# Patient Record
Sex: MALE | Race: WHITE | NOT HISPANIC OR LATINO | Employment: OTHER | URBAN - METROPOLITAN AREA
[De-identification: names, ages, dates, MRNs, and addresses within clinical notes are randomized per-mention and may not be internally consistent; named-entity substitution may affect disease eponyms.]

---

## 2024-09-20 ENCOUNTER — OFFICE VISIT (OUTPATIENT)
Dept: MEDICAL GROUP | Age: 69
End: 2024-09-20
Payer: MEDICARE

## 2024-09-20 VITALS
OXYGEN SATURATION: 95 % | HEART RATE: 63 BPM | WEIGHT: 245 LBS | SYSTOLIC BLOOD PRESSURE: 150 MMHG | HEIGHT: 70 IN | TEMPERATURE: 99.7 F | DIASTOLIC BLOOD PRESSURE: 90 MMHG | BODY MASS INDEX: 35.07 KG/M2

## 2024-09-20 DIAGNOSIS — I10 ESSENTIAL HYPERTENSION, BENIGN: ICD-10-CM

## 2024-09-20 DIAGNOSIS — Z12.5 SCREENING FOR PROSTATE CANCER: ICD-10-CM

## 2024-09-20 DIAGNOSIS — N40.1 BENIGN PROSTATIC HYPERPLASIA WITH URINARY HESITANCY: ICD-10-CM

## 2024-09-20 DIAGNOSIS — Z23 NEED FOR VACCINATION: ICD-10-CM

## 2024-09-20 DIAGNOSIS — Z76.89 ESTABLISHING CARE WITH NEW DOCTOR, ENCOUNTER FOR: ICD-10-CM

## 2024-09-20 DIAGNOSIS — Z12.11 SCREENING FOR COLORECTAL CANCER: ICD-10-CM

## 2024-09-20 DIAGNOSIS — R39.11 BENIGN PROSTATIC HYPERPLASIA WITH URINARY HESITANCY: ICD-10-CM

## 2024-09-20 DIAGNOSIS — K59.1 FUNCTIONAL DIARRHEA: ICD-10-CM

## 2024-09-20 DIAGNOSIS — Z00.00 BLOOD TESTS FOR ROUTINE GENERAL PHYSICAL EXAMINATION: ICD-10-CM

## 2024-09-20 DIAGNOSIS — E55.9 VITAMIN D DEFICIENCY: ICD-10-CM

## 2024-09-20 DIAGNOSIS — E03.9 ACQUIRED HYPOTHYROIDISM: ICD-10-CM

## 2024-09-20 DIAGNOSIS — E66.9 OBESITY (BMI 30-39.9): ICD-10-CM

## 2024-09-20 DIAGNOSIS — Z85.46 HISTORY OF PROSTATE CANCER: ICD-10-CM

## 2024-09-20 DIAGNOSIS — Z12.12 SCREENING FOR COLORECTAL CANCER: ICD-10-CM

## 2024-09-20 DIAGNOSIS — M17.2 POST-TRAUMATIC OSTEOARTHRITIS OF BOTH KNEES: ICD-10-CM

## 2024-09-20 PROBLEM — K52.9 COLITIS: Status: ACTIVE | Noted: 2020-05-01

## 2024-09-20 PROBLEM — Z87.442 HISTORY OF RENAL STONE: Status: ACTIVE | Noted: 2024-09-20

## 2024-09-20 PROBLEM — C61 PROSTATE CANCER (HCC): Chronic | Status: ACTIVE | Noted: 2017-09-18

## 2024-09-20 PROBLEM — Z87.442 HISTORY OF KIDNEY STONES: Status: RESOLVED | Noted: 2024-09-20 | Resolved: 2024-09-20

## 2024-09-20 PROCEDURE — 90677 PCV20 VACCINE IM: CPT | Performed by: PHYSICIAN ASSISTANT

## 2024-09-20 PROCEDURE — 3077F SYST BP >= 140 MM HG: CPT | Performed by: PHYSICIAN ASSISTANT

## 2024-09-20 PROCEDURE — 3080F DIAST BP >= 90 MM HG: CPT | Performed by: PHYSICIAN ASSISTANT

## 2024-09-20 PROCEDURE — G0009 ADMIN PNEUMOCOCCAL VACCINE: HCPCS | Performed by: PHYSICIAN ASSISTANT

## 2024-09-20 PROCEDURE — 99204 OFFICE O/P NEW MOD 45 MIN: CPT | Mod: 25 | Performed by: PHYSICIAN ASSISTANT

## 2024-09-20 RX ORDER — TAMSULOSIN HYDROCHLORIDE 0.4 MG/1
0.4 CAPSULE ORAL
Qty: 30 CAPSULE | Refills: 1 | Status: SHIPPED | OUTPATIENT
Start: 2024-09-20

## 2024-09-20 RX ORDER — LIOTHYRONINE SODIUM 5 UG/1
5 TABLET ORAL DAILY
COMMUNITY
Start: 2024-09-16

## 2024-09-20 RX ORDER — LISINOPRIL 40 MG/1
1 TABLET ORAL
COMMUNITY
Start: 2024-09-16

## 2024-09-20 RX ORDER — CYANOCOBALAMIN (VITAMIN B-12) 1000 MCG
1 TABLET ORAL DAILY
COMMUNITY

## 2024-09-20 RX ORDER — METOPROLOL SUCCINATE 100 MG/1
100 TABLET, EXTENDED RELEASE ORAL DAILY
COMMUNITY
Start: 2024-06-30

## 2024-09-20 RX ORDER — ALLOPURINOL 100 MG/1
200 TABLET ORAL DAILY
COMMUNITY

## 2024-09-20 RX ORDER — LEVOTHYROXINE SODIUM 200 UG/1
200 TABLET ORAL
COMMUNITY
Start: 2024-07-24

## 2024-09-20 RX ORDER — CHLORAL HYDRATE 500 MG
1000 CAPSULE ORAL DAILY
COMMUNITY

## 2024-09-20 RX ORDER — TAMSULOSIN HYDROCHLORIDE 0.4 MG/1
0.4 CAPSULE ORAL
COMMUNITY
End: 2024-09-20

## 2024-09-20 RX ORDER — LANOLIN ALCOHOL/MO/W.PET/CERES
1200 CREAM (GRAM) TOPICAL DAILY
COMMUNITY

## 2024-09-20 ASSESSMENT — PATIENT HEALTH QUESTIONNAIRE - PHQ9: CLINICAL INTERPRETATION OF PHQ2 SCORE: 0
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
## 2024-09-26 RX ORDER — CHOLESTYRAMINE 4 G/9G
1 POWDER, FOR SUSPENSION ORAL 2 TIMES DAILY
Qty: 1 EACH | Refills: 1
Start: 2024-09-26

## 2024-09-27 ENCOUNTER — OFFICE VISIT (OUTPATIENT)
Dept: UROLOGY | Facility: MEDICAL CENTER | Age: 69
End: 2024-09-27
Payer: MEDICARE

## 2024-09-27 VITALS — WEIGHT: 247.5 LBS | HEIGHT: 70 IN | BODY MASS INDEX: 35.43 KG/M2

## 2024-09-27 DIAGNOSIS — N99.112 POSTPROCEDURAL MEMBRANOUS URETHRAL STRICTURE: ICD-10-CM

## 2024-09-27 DIAGNOSIS — N13.5 URETERAL STRICTURE: ICD-10-CM

## 2024-09-27 DIAGNOSIS — Z85.46 HISTORY OF PROSTATE CANCER: ICD-10-CM

## 2024-09-27 RX ORDER — FUROSEMIDE 10 MG/ML
0.5 INJECTION INTRAMUSCULAR; INTRAVENOUS ONCE
Status: DISCONTINUED | OUTPATIENT
Start: 2024-09-27 | End: 2024-09-28

## 2024-09-27 NOTE — PROGRESS NOTES
Chief Complaint: prostate cancer, urinary symptoms    The patient was referred by Debbie Mays P.A.-C. for evaluation of the above chief complaint    History of Present Illness:    Moise Cagle is a 68 y.o. male who presents today for prostate cancer and urinary symptoms  - Diagnosed with high risk prostate cancer in 2017  - Received XRT + ADT + alexis/pred around that time  - Reports ADT + alexis/pred x 2 years  - Since that time PSA has remained undetectable  - No recent T testing  - Also with urinary symptoms, ?urethral stricture prior to radiation  - Has required multiple dilations in the past, documentation from  and   - Reports 2 pads / day incontinence  - Also with weak stream and incomplete emptying  - Also with ?L Hydronephrosis per US  - History of L stent in  with [facility] urology    Subjective    Family History   Problem Relation Age of Onset    Colon Cancer Mother     Glaucoma Father     No Known Problems Sister     No Known Problems Sister     No Known Problems Brother     No Known Problems Daughter     No Known Problems Son     No Known Problems Son      Social History     Socioeconomic History    Marital status:      Spouse name: Not on file    Number of children: Not on file    Years of education: Not on file    Highest education level: Not on file   Occupational History    Not on file   Tobacco Use    Smoking status: Former     Current packs/day: 0.00     Types: Cigarettes     Quit date:      Years since quittin.7     Passive exposure: Past    Smokeless tobacco: Former   Vaping Use    Vaping status: Never Used   Substance and Sexual Activity    Alcohol use: Not Currently     Alcohol/week: 0.6 oz     Types: 1 Cans of beer per week     Comment: socially    Drug use: Yes     Types: Marijuana     Comment: socially    Sexual activity: Not Currently     Partners: Female   Other Topics Concern    Not on file   Social History Narrative    Not on file     Social Determinants of  Health     Financial Resource Strain: Not on file   Food Insecurity: Not on file   Transportation Needs: Not on file   Physical Activity: Not on file   Stress: Not on file   Social Connections: Not on file   Intimate Partner Violence: Not on file   Housing Stability: Not on file     Past Surgical History:   Procedure Laterality Date    RESECTION, PARTIAL SMALL BOWEL  01/2020    OTHER      patella resection    TURP-VAPOR      UMBILICAL HERNIA REPAIR       Past Medical History:   Diagnosis Date    History of kidney stones 09/20/2024    Passed another 3-, 6-       Current Outpatient Medications   Medication Sig Dispense Refill    cholestyramine (QUESTRAN) 4 g packet Take 4 g by mouth 2 times a day. 1 Each 1    ASCORBIC ACID PO Take  by mouth every day.      calcium citrate 315 mg-vitamin D 5 mcg 315-5 MG-MCG per tablet Take 1,200 mg by mouth every day.      MULTIPLE VITAMIN PO Take 1 Tablet by mouth every day.      allopurinol (ZYLOPRIM) 100 MG Tab Take 200 mg by mouth every day.      Cyanocobalamin (B-12) 1000 MCG Tab Take 1 Tablet by mouth every day.      levothyroxine (SYNTHROID) 200 MCG Tab Take 200 mcg by mouth every morning on an empty stomach.      liothyronine (CYTOMEL) 5 MCG Tab Take 5 mcg by mouth every day.      lisinopril (PRINIVIL) 40 MG tablet Take 1 Tablet by mouth every day.      metoprolol SR (TOPROL XL) 100 MG TABLET SR 24 HR Take 100 mg by mouth every day.      Omega-3 Fatty Acids (FISH OIL) 1000 MG Cap capsule Take 1,000 mg by mouth every day.      Ferrous Sulfate (IRON PO) Take  by mouth.      Nutritional Supplements (NUTRITIONAL SUPPLEMENT PO) Take  by mouth. Heal n Soothe      tamsulosin (FLOMAX) 0.4 MG capsule Take 1 Capsule by mouth 1/2 hour after breakfast. 30 Capsule 1     No current facility-administered medications for this visit.     Allergies   Allergen Reactions    Hydrocodone-Acetaminophen Unspecified     Teeth grinding anxious (vicodin)    Teeth grinding anxious  (vicodin)   Anxiety    Morphine Unspecified     Hallucination     Hallucination    halucinates    Amlodipine Unspecified     Swelling of feet and ankles    Other Drug      Hay fever reaction Runny nose, cough       Review of systems was conducted and was negative except for as explicitly listed in the History of Present Illness.       Objective   There were no vitals taken for this visit.  Physical Exam    Lab/Radiology/Diagnostic Review:  CBC   Lab Results   Component Value Date/Time    WBC 4.9 01/22/2020 0510    HEMOGLOBIN 8.7 (L) 01/22/2020 0510    HEMATOCRIT 27.6 (L) 01/22/2020 0510    MCV 94 01/22/2020 0510    MCH 29.5 01/22/2020 0510    MCHC 31.5 01/22/2020 0510    RDW 14.8 01/22/2020 0510    MONOS 0.5 01/22/2020 0510    EOS 0.2 01/22/2020 0510    BASO 0.0 01/22/2020 0510       BMP   Lab Results   Component Value Date/Time    SODIUM 142 01/22/2020 0510    POTASSIUM 4.2 01/22/2020 0510    CHLORIDE 105 01/22/2020 0510    CO2 32 01/22/2020 0510    GLUCOSE 109 (H) 01/22/2020 0510    BUN 10 01/22/2020 0510    CREATININE 1.42 (H) 01/22/2020 0510    CALCIUM 8.9 01/22/2020 0510     Most recent PSA < 0.2 ng/mL    US RENAL 9/2024:    CONCLUSION: Stable severe left hydronephrosis with cortical thinning.   Stable right renal cyst and nonobstructing stone.     I have reviewed and independently examined the lab and imaging results.  My findings are given in the HPI or above with the associated tests.        Assessment & Plan    It was a pleasure speaking with Moise IVAN Cagle today.    Moise LOVE Gurjit is a 68 y.o. male w/ prostate cancer and urinary symptoms  1) prostate cancer  - Discussed AUA and NCCN guidelines  - Will continue with PSA monitoring  - Recommend T monitoring given prior history of ADT  2) urinary incontinence  - Recommend discontinuation of tamsulosin as this may worsen his incontinence  - Recommend RUG with Dr. Burleson for further evaluation  - Dr. Burleson was able to step into the office today to discuss  with the patient  3) left hydronephrosis  - Discussed options may include stent or reimplant  - Discussed if the kidney is non-functional and he is asymptomatic, we do not need to pursue treatment  - Recommend lasix renal scan to further evaluate renal function   - If <10% differential function, unlikely that reimplant will be helpful  - If >10% differential function, may consider stent vs. Reimplant if asymptomatic  - If symptomatic, will consider reimplant vs. Nephrectomy depending on function

## 2024-09-30 DIAGNOSIS — N99.112 POSTPROCEDURAL MEMBRANOUS URETHRAL STRICTURE: ICD-10-CM

## 2024-10-01 ENCOUNTER — TELEPHONE (OUTPATIENT)
Dept: UROLOGY | Facility: MEDICAL CENTER | Age: 69
End: 2024-10-01
Payer: MEDICARE

## 2024-10-09 ENCOUNTER — HOSPITAL ENCOUNTER (OUTPATIENT)
Dept: LAB | Facility: MEDICAL CENTER | Age: 69
End: 2024-10-09
Attending: PHYSICIAN ASSISTANT
Payer: MEDICARE

## 2024-10-09 DIAGNOSIS — Z12.5 SCREENING FOR PROSTATE CANCER: ICD-10-CM

## 2024-10-09 DIAGNOSIS — E55.9 VITAMIN D DEFICIENCY: ICD-10-CM

## 2024-10-09 DIAGNOSIS — Z00.00 BLOOD TESTS FOR ROUTINE GENERAL PHYSICAL EXAMINATION: ICD-10-CM

## 2024-10-09 DIAGNOSIS — E03.9 ACQUIRED HYPOTHYROIDISM: ICD-10-CM

## 2024-10-09 DIAGNOSIS — Z85.46 HISTORY OF PROSTATE CANCER: ICD-10-CM

## 2024-10-09 DIAGNOSIS — E66.9 OBESITY (BMI 30-39.9): ICD-10-CM

## 2024-10-09 LAB
25(OH)D3 SERPL-MCNC: 41 NG/ML (ref 30–100)
ALBUMIN SERPL BCP-MCNC: 4 G/DL (ref 3.2–4.9)
ALBUMIN/GLOB SERPL: 1.2 G/DL
ALP SERPL-CCNC: 68 U/L (ref 30–99)
ALT SERPL-CCNC: 8 U/L (ref 2–50)
ANION GAP SERPL CALC-SCNC: 14 MMOL/L (ref 7–16)
AST SERPL-CCNC: 11 U/L (ref 12–45)
BASOPHILS # BLD AUTO: 0.5 % (ref 0–1.8)
BASOPHILS # BLD: 0.04 K/UL (ref 0–0.12)
BILIRUB SERPL-MCNC: 0.3 MG/DL (ref 0.1–1.5)
BUN SERPL-MCNC: 29 MG/DL (ref 8–22)
CALCIUM ALBUM COR SERPL-MCNC: 9.9 MG/DL (ref 8.5–10.5)
CALCIUM SERPL-MCNC: 9.9 MG/DL (ref 8.5–10.5)
CHLORIDE SERPL-SCNC: 103 MMOL/L (ref 96–112)
CHOLEST SERPL-MCNC: 131 MG/DL (ref 100–199)
CO2 SERPL-SCNC: 21 MMOL/L (ref 20–33)
CREAT SERPL-MCNC: 1.81 MG/DL (ref 0.5–1.4)
EOSINOPHIL # BLD AUTO: 0.35 K/UL (ref 0–0.51)
EOSINOPHIL NFR BLD: 4.7 % (ref 0–6.9)
ERYTHROCYTE [DISTWIDTH] IN BLOOD BY AUTOMATED COUNT: 52.6 FL (ref 35.9–50)
GFR SERPLBLD CREATININE-BSD FMLA CKD-EPI: 40 ML/MIN/1.73 M 2
GLOBULIN SER CALC-MCNC: 3.4 G/DL (ref 1.9–3.5)
GLUCOSE SERPL-MCNC: 88 MG/DL (ref 65–99)
HCT VFR BLD AUTO: 39.1 % (ref 42–52)
HDLC SERPL-MCNC: 28 MG/DL
HGB BLD-MCNC: 12.8 G/DL (ref 14–18)
IMM GRANULOCYTES # BLD AUTO: 0.04 K/UL (ref 0–0.11)
IMM GRANULOCYTES NFR BLD AUTO: 0.5 % (ref 0–0.9)
LDLC SERPL CALC-MCNC: 70 MG/DL
LYMPHOCYTES # BLD AUTO: 1.06 K/UL (ref 1–4.8)
LYMPHOCYTES NFR BLD: 14.2 % (ref 22–41)
MCH RBC QN AUTO: 32.8 PG (ref 27–33)
MCHC RBC AUTO-ENTMCNC: 32.7 G/DL (ref 32.3–36.5)
MCV RBC AUTO: 100.3 FL (ref 81.4–97.8)
MONOCYTES # BLD AUTO: 0.91 K/UL (ref 0–0.85)
MONOCYTES NFR BLD AUTO: 12.2 % (ref 0–13.4)
NEUTROPHILS # BLD AUTO: 5.05 K/UL (ref 1.82–7.42)
NEUTROPHILS NFR BLD: 67.9 % (ref 44–72)
NRBC # BLD AUTO: 0 K/UL
NRBC BLD-RTO: 0 /100 WBC (ref 0–0.2)
PLATELET # BLD AUTO: 218 K/UL (ref 164–446)
PMV BLD AUTO: 12.7 FL (ref 9–12.9)
POTASSIUM SERPL-SCNC: 4.4 MMOL/L (ref 3.6–5.5)
PROT SERPL-MCNC: 7.4 G/DL (ref 6–8.2)
PSA SERPL-MCNC: 0.21 NG/ML (ref 0–4)
RBC # BLD AUTO: 3.9 M/UL (ref 4.7–6.1)
SODIUM SERPL-SCNC: 138 MMOL/L (ref 135–145)
TESTOST SERPL-MCNC: 295 NG/DL (ref 175–781)
TRIGL SERPL-MCNC: 165 MG/DL (ref 0–149)
TSH SERPL DL<=0.005 MIU/L-ACNC: 0.4 UIU/ML (ref 0.38–5.33)
WBC # BLD AUTO: 7.5 K/UL (ref 4.8–10.8)

## 2024-10-09 PROCEDURE — 84403 ASSAY OF TOTAL TESTOSTERONE: CPT

## 2024-10-09 PROCEDURE — 84153 ASSAY OF PSA TOTAL: CPT | Mod: GA

## 2024-10-09 PROCEDURE — 82306 VITAMIN D 25 HYDROXY: CPT

## 2024-10-09 PROCEDURE — 85025 COMPLETE CBC W/AUTO DIFF WBC: CPT

## 2024-10-09 PROCEDURE — 36415 COLL VENOUS BLD VENIPUNCTURE: CPT

## 2024-10-09 PROCEDURE — 84443 ASSAY THYROID STIM HORMONE: CPT

## 2024-10-09 PROCEDURE — 80053 COMPREHEN METABOLIC PANEL: CPT

## 2024-10-09 PROCEDURE — 80061 LIPID PANEL: CPT

## 2024-10-10 DIAGNOSIS — D64.9 ANEMIA, UNSPECIFIED TYPE: ICD-10-CM

## 2024-10-10 DIAGNOSIS — N28.9 DECREASED RENAL FUNCTION: ICD-10-CM

## 2024-10-13 DIAGNOSIS — N40.1 BENIGN PROSTATIC HYPERPLASIA WITH URINARY HESITANCY: ICD-10-CM

## 2024-10-13 DIAGNOSIS — R39.11 BENIGN PROSTATIC HYPERPLASIA WITH URINARY HESITANCY: ICD-10-CM

## 2024-10-14 ENCOUNTER — HOSPITAL ENCOUNTER (OUTPATIENT)
Dept: LAB | Facility: MEDICAL CENTER | Age: 69
End: 2024-10-14
Attending: PHYSICIAN ASSISTANT
Payer: MEDICARE

## 2024-10-14 DIAGNOSIS — D64.9 ANEMIA, UNSPECIFIED TYPE: ICD-10-CM

## 2024-10-14 DIAGNOSIS — N28.9 DECREASED RENAL FUNCTION: ICD-10-CM

## 2024-10-14 LAB
ALBUMIN SERPL BCP-MCNC: 4 G/DL (ref 3.2–4.9)
ALBUMIN/GLOB SERPL: 1.2 G/DL
ALP SERPL-CCNC: 72 U/L (ref 30–99)
ALT SERPL-CCNC: 7 U/L (ref 2–50)
ANION GAP SERPL CALC-SCNC: 14 MMOL/L (ref 7–16)
AST SERPL-CCNC: 13 U/L (ref 12–45)
BASOPHILS # BLD AUTO: 0.6 % (ref 0–1.8)
BASOPHILS # BLD: 0.05 K/UL (ref 0–0.12)
BILIRUB SERPL-MCNC: 0.4 MG/DL (ref 0.1–1.5)
BUN SERPL-MCNC: 34 MG/DL (ref 8–22)
CALCIUM ALBUM COR SERPL-MCNC: 10.5 MG/DL (ref 8.5–10.5)
CALCIUM SERPL-MCNC: 10.5 MG/DL (ref 8.5–10.5)
CHLORIDE SERPL-SCNC: 105 MMOL/L (ref 96–112)
CO2 SERPL-SCNC: 21 MMOL/L (ref 20–33)
CREAT SERPL-MCNC: 1.86 MG/DL (ref 0.5–1.4)
EOSINOPHIL # BLD AUTO: 0.32 K/UL (ref 0–0.51)
EOSINOPHIL NFR BLD: 3.7 % (ref 0–6.9)
ERYTHROCYTE [DISTWIDTH] IN BLOOD BY AUTOMATED COUNT: 51.1 FL (ref 35.9–50)
GFR SERPLBLD CREATININE-BSD FMLA CKD-EPI: 38 ML/MIN/1.73 M 2
GLOBULIN SER CALC-MCNC: 3.4 G/DL (ref 1.9–3.5)
GLUCOSE SERPL-MCNC: 84 MG/DL (ref 65–99)
HCT VFR BLD AUTO: 39.6 % (ref 42–52)
HGB BLD-MCNC: 12.7 G/DL (ref 14–18)
IMM GRANULOCYTES # BLD AUTO: 0.04 K/UL (ref 0–0.11)
IMM GRANULOCYTES NFR BLD AUTO: 0.5 % (ref 0–0.9)
IRON SATN MFR SERPL: 22 % (ref 15–55)
IRON SERPL-MCNC: 41 UG/DL (ref 50–180)
LYMPHOCYTES # BLD AUTO: 1.16 K/UL (ref 1–4.8)
LYMPHOCYTES NFR BLD: 13.5 % (ref 22–41)
MCH RBC QN AUTO: 31.6 PG (ref 27–33)
MCHC RBC AUTO-ENTMCNC: 32.1 G/DL (ref 32.3–36.5)
MCV RBC AUTO: 98.5 FL (ref 81.4–97.8)
MONOCYTES # BLD AUTO: 1.05 K/UL (ref 0–0.85)
MONOCYTES NFR BLD AUTO: 12.2 % (ref 0–13.4)
NEUTROPHILS # BLD AUTO: 6 K/UL (ref 1.82–7.42)
NEUTROPHILS NFR BLD: 69.5 % (ref 44–72)
NRBC # BLD AUTO: 0 K/UL
NRBC BLD-RTO: 0 /100 WBC (ref 0–0.2)
PLATELET # BLD AUTO: 209 K/UL (ref 164–446)
PMV BLD AUTO: 12.4 FL (ref 9–12.9)
POTASSIUM SERPL-SCNC: 4.9 MMOL/L (ref 3.6–5.5)
PROT SERPL-MCNC: 7.4 G/DL (ref 6–8.2)
RBC # BLD AUTO: 4.02 M/UL (ref 4.7–6.1)
SODIUM SERPL-SCNC: 140 MMOL/L (ref 135–145)
TIBC SERPL-MCNC: 187 UG/DL (ref 250–450)
UIBC SERPL-MCNC: 146 UG/DL (ref 110–370)
WBC # BLD AUTO: 8.6 K/UL (ref 4.8–10.8)

## 2024-10-14 PROCEDURE — 83550 IRON BINDING TEST: CPT

## 2024-10-14 PROCEDURE — 36415 COLL VENOUS BLD VENIPUNCTURE: CPT

## 2024-10-14 PROCEDURE — 83540 ASSAY OF IRON: CPT

## 2024-10-14 PROCEDURE — 85025 COMPLETE CBC W/AUTO DIFF WBC: CPT

## 2024-10-14 PROCEDURE — 82607 VITAMIN B-12: CPT

## 2024-10-14 PROCEDURE — 80053 COMPREHEN METABOLIC PANEL: CPT

## 2024-10-14 PROCEDURE — 82728 ASSAY OF FERRITIN: CPT

## 2024-10-14 RX ORDER — TAMSULOSIN HYDROCHLORIDE 0.4 MG/1
0.4 CAPSULE ORAL
Qty: 90 CAPSULE | Refills: 1 | Status: SHIPPED | OUTPATIENT
Start: 2024-10-14 | End: 2024-10-18

## 2024-10-15 ENCOUNTER — OFFICE VISIT (OUTPATIENT)
Dept: UROLOGY | Facility: MEDICAL CENTER | Age: 69
End: 2024-10-15
Attending: UROLOGY
Payer: MEDICARE

## 2024-10-15 VITALS — WEIGHT: 247 LBS | HEIGHT: 70 IN | BODY MASS INDEX: 35.36 KG/M2

## 2024-10-15 DIAGNOSIS — Q62.39 UPJ OBSTRUCTION, CONGENITAL: ICD-10-CM

## 2024-10-15 DIAGNOSIS — N39.3 SUI (STRESS URINARY INCONTINENCE), MALE: ICD-10-CM

## 2024-10-15 DIAGNOSIS — Z85.46 HISTORY OF PROSTATE CANCER: ICD-10-CM

## 2024-10-15 DIAGNOSIS — N13.5 URETERAL STRICTURE: ICD-10-CM

## 2024-10-15 DIAGNOSIS — N99.112 POSTPROCEDURAL MEMBRANOUS URETHRAL STRICTURE: ICD-10-CM

## 2024-10-15 LAB
APPEARANCE UR: NORMAL
BILIRUB UR STRIP-MCNC: NORMAL MG/DL
COLOR UR AUTO: NORMAL
FERRITIN SERPL-MCNC: 1189 NG/ML (ref 22–322)
GLUCOSE UR STRIP.AUTO-MCNC: NORMAL MG/DL
KETONES UR STRIP.AUTO-MCNC: NORMAL MG/DL
LEUKOCYTE ESTERASE UR QL STRIP.AUTO: NORMAL
NITRITE UR QL STRIP.AUTO: NORMAL
PH UR STRIP.AUTO: 6 [PH] (ref 5–8)
PROT UR QL STRIP: 30 MG/DL
RBC UR QL AUTO: NORMAL
SP GR UR STRIP.AUTO: 1.01
UROBILINOGEN UR STRIP-MCNC: 0.2 MG/DL
VIT B12 SERPL-MCNC: 1187 PG/ML (ref 211–911)

## 2024-10-15 PROCEDURE — 74450 X-RAY URETHRA/BLADDER: CPT | Performed by: UROLOGY

## 2024-10-15 PROCEDURE — 81002 URINALYSIS NONAUTO W/O SCOPE: CPT | Performed by: UROLOGY

## 2024-10-15 PROCEDURE — 51610 INJECTION FOR BLADDER X-RAY: CPT | Performed by: UROLOGY

## 2024-10-15 PROCEDURE — 99214 OFFICE O/P EST MOD 30 MIN: CPT | Mod: 25 | Performed by: UROLOGY

## 2024-10-15 ASSESSMENT — FIBROSIS 4 INDEX: FIB4 SCORE: 1.65

## 2024-10-18 ENCOUNTER — OFFICE VISIT (OUTPATIENT)
Dept: MEDICAL GROUP | Age: 69
End: 2024-10-18
Payer: MEDICARE

## 2024-10-18 VITALS
BODY MASS INDEX: 34.79 KG/M2 | HEIGHT: 70 IN | DIASTOLIC BLOOD PRESSURE: 88 MMHG | TEMPERATURE: 98.9 F | RESPIRATION RATE: 18 BRPM | WEIGHT: 243 LBS | SYSTOLIC BLOOD PRESSURE: 150 MMHG | OXYGEN SATURATION: 96 % | HEART RATE: 78 BPM

## 2024-10-18 DIAGNOSIS — Z85.46 HISTORY OF PROSTATE CANCER: ICD-10-CM

## 2024-10-18 DIAGNOSIS — E55.9 VITAMIN D DEFICIENCY: ICD-10-CM

## 2024-10-18 DIAGNOSIS — N13.5 URETERAL STRICTURE: ICD-10-CM

## 2024-10-18 DIAGNOSIS — E03.9 ACQUIRED HYPOTHYROIDISM: ICD-10-CM

## 2024-10-18 DIAGNOSIS — I10 ESSENTIAL HYPERTENSION, BENIGN: ICD-10-CM

## 2024-10-18 DIAGNOSIS — N28.9 DECREASED RENAL FUNCTION: ICD-10-CM

## 2024-10-18 DIAGNOSIS — R79.89 ELEVATED FERRITIN LEVEL: ICD-10-CM

## 2024-10-18 PROCEDURE — 3077F SYST BP >= 140 MM HG: CPT | Performed by: PHYSICIAN ASSISTANT

## 2024-10-18 PROCEDURE — 1125F AMNT PAIN NOTED PAIN PRSNT: CPT | Performed by: PHYSICIAN ASSISTANT

## 2024-10-18 PROCEDURE — 99214 OFFICE O/P EST MOD 30 MIN: CPT | Performed by: PHYSICIAN ASSISTANT

## 2024-10-18 PROCEDURE — 3079F DIAST BP 80-89 MM HG: CPT | Performed by: PHYSICIAN ASSISTANT

## 2024-10-18 RX ORDER — METOPROLOL SUCCINATE 100 MG/1
150 TABLET, EXTENDED RELEASE ORAL DAILY
Qty: 135 TABLET | Refills: 3 | Status: SHIPPED | OUTPATIENT
Start: 2024-10-18

## 2024-10-18 ASSESSMENT — FIBROSIS 4 INDEX: FIB4 SCORE: 1.65

## 2024-10-18 ASSESSMENT — PAIN SCALES - GENERAL: PAINLEVEL: 5=MODERATE PAIN

## 2024-10-23 ENCOUNTER — PATIENT MESSAGE (OUTPATIENT)
Dept: MEDICAL GROUP | Age: 69
End: 2024-10-23
Payer: MEDICARE

## 2024-10-23 DIAGNOSIS — E03.9 ACQUIRED HYPOTHYROIDISM: ICD-10-CM

## 2024-10-23 DIAGNOSIS — M1A.9XX0 CHRONIC GOUT WITHOUT TOPHUS, UNSPECIFIED CAUSE, UNSPECIFIED SITE: ICD-10-CM

## 2024-10-23 RX ORDER — ALLOPURINOL 100 MG/1
200 TABLET ORAL DAILY
Qty: 180 TABLET | Refills: 3 | Status: SHIPPED | OUTPATIENT
Start: 2024-10-23

## 2024-10-23 RX ORDER — LEVOTHYROXINE SODIUM 200 UG/1
200 TABLET ORAL
Qty: 90 TABLET | Refills: 3 | Status: SHIPPED | OUTPATIENT
Start: 2024-10-23

## 2024-11-01 ENCOUNTER — HOSPITAL ENCOUNTER (OUTPATIENT)
Dept: LAB | Facility: MEDICAL CENTER | Age: 69
End: 2024-11-01
Attending: PHYSICIAN ASSISTANT
Payer: MEDICARE

## 2024-11-01 DIAGNOSIS — R79.89 ELEVATED FERRITIN LEVEL: ICD-10-CM

## 2024-11-01 LAB
BASOPHILS # BLD AUTO: 0.3 % (ref 0–1.8)
BASOPHILS # BLD: 0.02 K/UL (ref 0–0.12)
EOSINOPHIL # BLD AUTO: 0.2 K/UL (ref 0–0.51)
EOSINOPHIL NFR BLD: 3 % (ref 0–6.9)
ERYTHROCYTE [DISTWIDTH] IN BLOOD BY AUTOMATED COUNT: 52 FL (ref 35.9–50)
HCT VFR BLD AUTO: 39.3 % (ref 42–52)
HGB BLD-MCNC: 12.7 G/DL (ref 14–18)
IMM GRANULOCYTES # BLD AUTO: 0.03 K/UL (ref 0–0.11)
IMM GRANULOCYTES NFR BLD AUTO: 0.5 % (ref 0–0.9)
LYMPHOCYTES # BLD AUTO: 0.92 K/UL (ref 1–4.8)
LYMPHOCYTES NFR BLD: 13.9 % (ref 22–41)
MCH RBC QN AUTO: 31.8 PG (ref 27–33)
MCHC RBC AUTO-ENTMCNC: 32.3 G/DL (ref 32.3–36.5)
MCV RBC AUTO: 98.3 FL (ref 81.4–97.8)
MONOCYTES # BLD AUTO: 0.76 K/UL (ref 0–0.85)
MONOCYTES NFR BLD AUTO: 11.5 % (ref 0–13.4)
NEUTROPHILS # BLD AUTO: 4.7 K/UL (ref 1.82–7.42)
NEUTROPHILS NFR BLD: 70.8 % (ref 44–72)
NRBC # BLD AUTO: 0 K/UL
NRBC BLD-RTO: 0 /100 WBC (ref 0–0.2)
PLATELET # BLD AUTO: 193 K/UL (ref 164–446)
PMV BLD AUTO: 13.1 FL (ref 9–12.9)
RBC # BLD AUTO: 4 M/UL (ref 4.7–6.1)
WBC # BLD AUTO: 6.6 K/UL (ref 4.8–10.8)

## 2024-11-01 PROCEDURE — 83550 IRON BINDING TEST: CPT

## 2024-11-01 PROCEDURE — 83540 ASSAY OF IRON: CPT

## 2024-11-01 PROCEDURE — 82728 ASSAY OF FERRITIN: CPT

## 2024-11-01 PROCEDURE — 36415 COLL VENOUS BLD VENIPUNCTURE: CPT

## 2024-11-01 PROCEDURE — 85025 COMPLETE CBC W/AUTO DIFF WBC: CPT

## 2024-11-02 LAB
FERRITIN SERPL-MCNC: 1149 NG/ML (ref 22–322)
IRON SATN MFR SERPL: 25 % (ref 15–55)
IRON SERPL-MCNC: 44 UG/DL (ref 50–180)
TIBC SERPL-MCNC: 175 UG/DL (ref 250–450)
UIBC SERPL-MCNC: 131 UG/DL (ref 110–370)

## 2024-11-14 ENCOUNTER — HOSPITAL ENCOUNTER (OUTPATIENT)
Dept: RADIOLOGY | Facility: MEDICAL CENTER | Age: 69
End: 2024-11-14
Attending: UROLOGY
Payer: MEDICARE

## 2024-11-14 DIAGNOSIS — N13.5 URETERAL STRICTURE: ICD-10-CM

## 2024-11-14 PROCEDURE — 78708 K FLOW/FUNCT IMAGE W/DRUG: CPT

## 2024-11-14 RX ORDER — FUROSEMIDE 10 MG/ML
INJECTION INTRAMUSCULAR; INTRAVENOUS
Status: DISPENSED
Start: 2024-11-14 | End: 2024-11-14

## 2024-11-15 ENCOUNTER — HOSPITAL ENCOUNTER (OUTPATIENT)
Dept: HEMATOLOGY ONCOLOGY | Facility: MEDICAL CENTER | Age: 69
End: 2024-11-15
Attending: STUDENT IN AN ORGANIZED HEALTH CARE EDUCATION/TRAINING PROGRAM
Payer: MEDICARE

## 2024-11-15 VITALS
TEMPERATURE: 99 F | WEIGHT: 237.88 LBS | OXYGEN SATURATION: 96 % | BODY MASS INDEX: 34.06 KG/M2 | DIASTOLIC BLOOD PRESSURE: 94 MMHG | HEIGHT: 70 IN | HEART RATE: 63 BPM | SYSTOLIC BLOOD PRESSURE: 170 MMHG

## 2024-11-15 DIAGNOSIS — D64.9 NORMOCYTIC ANEMIA: ICD-10-CM

## 2024-11-15 PROCEDURE — 99204 OFFICE O/P NEW MOD 45 MIN: CPT | Performed by: STUDENT IN AN ORGANIZED HEALTH CARE EDUCATION/TRAINING PROGRAM

## 2024-11-15 PROCEDURE — 99212 OFFICE O/P EST SF 10 MIN: CPT | Performed by: STUDENT IN AN ORGANIZED HEALTH CARE EDUCATION/TRAINING PROGRAM

## 2024-11-15 ASSESSMENT — FIBROSIS 4 INDEX: FIB4 SCORE: 1.78

## 2024-11-18 ENCOUNTER — APPOINTMENT (OUTPATIENT)
Dept: MEDICAL GROUP | Age: 69
End: 2024-11-18
Payer: MEDICARE

## 2024-11-18 ENCOUNTER — HOSPITAL ENCOUNTER (OUTPATIENT)
Dept: LAB | Facility: MEDICAL CENTER | Age: 69
End: 2024-11-18
Attending: STUDENT IN AN ORGANIZED HEALTH CARE EDUCATION/TRAINING PROGRAM
Payer: MEDICARE

## 2024-11-18 VITALS
TEMPERATURE: 97 F | DIASTOLIC BLOOD PRESSURE: 88 MMHG | SYSTOLIC BLOOD PRESSURE: 162 MMHG | HEIGHT: 70 IN | HEART RATE: 60 BPM | BODY MASS INDEX: 33.93 KG/M2 | WEIGHT: 237 LBS | OXYGEN SATURATION: 97 %

## 2024-11-18 DIAGNOSIS — I10 ESSENTIAL HYPERTENSION, BENIGN: ICD-10-CM

## 2024-11-18 DIAGNOSIS — R79.89 ELEVATED FERRITIN LEVEL: ICD-10-CM

## 2024-11-18 DIAGNOSIS — D64.9 NORMOCYTIC ANEMIA: ICD-10-CM

## 2024-11-18 LAB
BASOPHILS # BLD AUTO: 0.5 % (ref 0–1.8)
BASOPHILS # BLD: 0.03 K/UL (ref 0–0.12)
CRP SERPL HS-MCNC: 31 MG/L (ref 0–3)
EOSINOPHIL # BLD AUTO: 0.28 K/UL (ref 0–0.51)
EOSINOPHIL NFR BLD: 4.8 % (ref 0–6.9)
ERYTHROCYTE [DISTWIDTH] IN BLOOD BY AUTOMATED COUNT: 52.7 FL (ref 35.9–50)
ERYTHROCYTE [SEDIMENTATION RATE] IN BLOOD BY WESTERGREN METHOD: 62 MM/HOUR (ref 0–20)
FOLATE SERPL-MCNC: 32.3 NG/ML
HCT VFR BLD AUTO: 38.4 % (ref 42–52)
HGB BLD-MCNC: 12.4 G/DL (ref 14–18)
HGB RETIC QN AUTO: 35.9 PG/CELL (ref 29–35)
IMM GRANULOCYTES # BLD AUTO: 0.05 K/UL (ref 0–0.11)
IMM GRANULOCYTES NFR BLD AUTO: 0.9 % (ref 0–0.9)
IMM RETICS NFR: 11.5 % (ref 2.6–16.1)
LYMPHOCYTES # BLD AUTO: 0.91 K/UL (ref 1–4.8)
LYMPHOCYTES NFR BLD: 15.6 % (ref 22–41)
MCH RBC QN AUTO: 32 PG (ref 27–33)
MCHC RBC AUTO-ENTMCNC: 32.3 G/DL (ref 32.3–36.5)
MCV RBC AUTO: 99.2 FL (ref 81.4–97.8)
MONOCYTES # BLD AUTO: 0.58 K/UL (ref 0–0.85)
MONOCYTES NFR BLD AUTO: 9.9 % (ref 0–13.4)
NEUTROPHILS # BLD AUTO: 4 K/UL (ref 1.82–7.42)
NEUTROPHILS NFR BLD: 68.3 % (ref 44–72)
NRBC # BLD AUTO: 0 K/UL
NRBC BLD-RTO: 0 /100 WBC (ref 0–0.2)
PLATELET # BLD AUTO: 209 K/UL (ref 164–446)
PMV BLD AUTO: 12.6 FL (ref 9–12.9)
RBC # BLD AUTO: 3.87 M/UL (ref 4.7–6.1)
RETICS # AUTO: 0.06 M/UL (ref 0.04–0.12)
RETICS/RBC NFR: 1.5 % (ref 0.8–2.6)
WBC # BLD AUTO: 5.9 K/UL (ref 4.8–10.8)

## 2024-11-18 PROCEDURE — 82746 ASSAY OF FOLIC ACID SERUM: CPT

## 2024-11-18 PROCEDURE — 85046 RETICYTE/HGB CONCENTRATE: CPT

## 2024-11-18 PROCEDURE — 36415 COLL VENOUS BLD VENIPUNCTURE: CPT

## 2024-11-18 PROCEDURE — 85025 COMPLETE CBC W/AUTO DIFF WBC: CPT

## 2024-11-18 PROCEDURE — 3077F SYST BP >= 140 MM HG: CPT | Performed by: PHYSICIAN ASSISTANT

## 2024-11-18 PROCEDURE — 82525 ASSAY OF COPPER: CPT

## 2024-11-18 PROCEDURE — 99214 OFFICE O/P EST MOD 30 MIN: CPT | Performed by: PHYSICIAN ASSISTANT

## 2024-11-18 PROCEDURE — 85652 RBC SED RATE AUTOMATED: CPT

## 2024-11-18 PROCEDURE — 86141 C-REACTIVE PROTEIN HS: CPT | Mod: GA

## 2024-11-18 PROCEDURE — 84238 ASSAY NONENDOCRINE RECEPTOR: CPT

## 2024-11-18 PROCEDURE — 86038 ANTINUCLEAR ANTIBODIES: CPT

## 2024-11-18 PROCEDURE — 3079F DIAST BP 80-89 MM HG: CPT | Performed by: PHYSICIAN ASSISTANT

## 2024-11-18 RX ORDER — METOPROLOL SUCCINATE 100 MG/1
200 TABLET, EXTENDED RELEASE ORAL DAILY
Qty: 135 TABLET | Refills: 3 | Status: SHIPPED | OUTPATIENT
Start: 2024-11-18

## 2024-11-18 ASSESSMENT — FIBROSIS 4 INDEX: FIB4 SCORE: 1.78

## 2024-11-18 NOTE — PROGRESS NOTES
cc: Hypertension/medication review    Subjective:     HPI    History of Present Illness  The patient is a 68-year-old male presenting to follow up on hypertension.    Metoprolol was increased to 150 mg in addition to 40 mg of lisinopril. He has been monitoring his blood pressure at home and has brought the readings for review. He reports no adverse effects from the increased dosage of metoprolol, such as dizziness or lightheadedness.  Blood pressure readings are still consistently between 140-160 systolic.  However when checking blood pressure in clinic today with his home blood pressure monitor his monitor is running about 10 points higher than manual readings.    He saw the hematologist on Friday to evaluate for elevated ferritin levels.  He had additional labs ordered, completed this today will be following up with a virtual visit with the hematologist on 11/27/2024.          Review of systems:  See above.       Current Outpatient Medications:     metoprolol SR (TOPROL XL) 100 MG TABLET SR 24 HR, Take 2 Tablets by mouth every day., Disp: 135 Tablet, Rfl: 3    allopurinol (ZYLOPRIM) 100 MG Tab, Take 2 Tablets by mouth every day., Disp: 180 Tablet, Rfl: 3    levothyroxine (SYNTHROID) 200 MCG Tab, Take 1 Tablet by mouth every morning on an empty stomach., Disp: 90 Tablet, Rfl: 3    cholestyramine (QUESTRAN) 4 g packet, Take 4 g by mouth 2 times a day., Disp: 1 Each, Rfl: 1    ASCORBIC ACID PO, Take  by mouth every day., Disp: , Rfl:     calcium citrate 315 mg-vitamin D 5 mcg 315-5 MG-MCG per tablet, Take 1,200 mg by mouth every day., Disp: , Rfl:     MULTIPLE VITAMIN PO, Take 1 Tablet by mouth every day., Disp: , Rfl:     liothyronine (CYTOMEL) 5 MCG Tab, Take 5 mcg by mouth every day., Disp: , Rfl:     lisinopril (PRINIVIL) 40 MG tablet, Take 1 Tablet by mouth every day., Disp: , Rfl:     Omega-3 Fatty Acids (FISH OIL) 1000 MG Cap capsule, Take 1,000 mg by mouth every day., Disp: , Rfl:     Nutritional Supplements  "(NUTRITIONAL SUPPLEMENT PO), Take  by mouth. Heal n Soothe, Disp: , Rfl:     Allergies, past medical history, past surgical history, family history, social history reviewed and updated    Objective:     Vitals: BP (!) 162/88 (BP Location: Left arm, Patient Position: Sitting, BP Cuff Size: Adult)   Pulse 60   Temp 36.1 °C (97 °F) (Temporal)   Ht 1.778 m (5' 10\")   Wt 108 kg (237 lb)   SpO2 97%   BMI 34.01 kg/m²   General: Alert, pleasant, NAD  HEENT: Normocephalic. Neck supple.   No carotid bruits   Heart: Regular rate and rhythm.  S1 and S2 normal.  No murmurs appreciated.  Respiratory: Normal respiratory effort.  Clear to auscultation bilaterally.  Skin: Warm, dry, no rashes.  Psych:  Affect/mood is normal, judgement is good, memory is intact, grooming is appropriate.    Assessment/Plan:     Luis was seen today for hypertension follow-up.    Diagnoses and all orders for this visit:    Essential hypertension, benign  Uncontrolled.  His home blood pressure readings are still elevated, his blood pressure cuff is not accurate, he is running 10 points higher.  Will increase metoprolol to 200 mg.  Continue monitor blood pressure if consistently greater than 140 systolic after 2 weeks contact the clinic and we will plan on starting hydrochlorothiazide.  -     metoprolol SR (TOPROL XL) 100 MG TABLET SR 24 HR; Take 2 Tablets by mouth every day.    Elevated ferritin level  Has established with hematology.  Following up next week to review additional blood work.      Return in about 4 weeks (around 12/16/2024) for HTN.  "

## 2024-11-20 LAB
COPPER SERPL-MCNC: 104.3 UG/DL (ref 70–140)
NUCLEAR IGG SER QL IA: NORMAL
TEST NAME 95000: NORMAL

## 2024-11-20 ASSESSMENT — ENCOUNTER SYMPTOMS
HEARTBURN: 0
CHILLS: 0
COUGH: 0
NAUSEA: 0
DIZZINESS: 0
HEADACHES: 0
WEIGHT LOSS: 0
PALPITATIONS: 0
DIAPHORESIS: 0
FEVER: 0
MYALGIAS: 0
SHORTNESS OF BREATH: 0

## 2024-11-20 NOTE — PROGRESS NOTES
Consult:  Hematology/Oncology    Primary Care:  Debbie Mays P.A.-C.    Diagnosis:   Elevated Ferritin    Chief Complaint:  Establish Care     History of Presenting Illness:  Moise Cagle is a 68 y.o. male with PMH Prostate Cancer, hypertension, nephrolithiasis, hypothyroidism who presents today to establish care with concern for hyper ferritinemia and borderline macrocytic anemia.  Patient has had anemia documented as far back as 4 years ago, which was the time of his prostate cancer diagnosis.  Previously was taking iron supplements but was told to discontinue them.  Iron studies were done in October Tober 2024 with iron studies low at 41 but iron binding capacity low at 187.  Ferritin elevated at 1189.  B12 elevated.  Patient seen anemia has been documented for him as far back as 2009.    In regards to his prostate cancer he was sent to urology after elevated PSA.  Prostate cancer history is as follows:  09/29/2015 PSA 2.3  05/03/2017 PSA 6.9    06/03/2017 Presented to doctor with hematuria, LUTS (severe urinary frequency, urgency, nocturia, difficulty voiding).   07/03/2017 PSA 7.9; CHANDANA was distinctly abnormal, firm, and indurated with extension into the left SV.   07/25/2017 CT abdomen pelvis negative for distant metastatic disease or lymphadenopathy.   08/11/2017 Dr. Adan performed cystoscopy and found papillary neoplasm within urethra suspicious for urothelial carcinoma.   09/18/2017 Underwent TURBT and prostate biopsy. Pathology returned showing [place] 5+5 prostate cancer in 7/7 cores, including a core at the prostatic urethra.    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  10/04/2017 Bone scan negative for metastatic disease.   10/16/2017 Seen by Dr. Maynard in Urology at [facility].     11/03/2017 PSMA PET Small bilateral anterior right and left external iliac lymph nodes demonstrate mildly increased PSMA activity concerning for metastatic disease.  Heterogeneously increased PSMA activity in the prostate gland, known to represent the patient's prostate cancer. Very tiny focus of PSMA anterior and to the left of the prostate gland, which seems to correlate with a 7 mm enhancing lesion in the left inferior pubic ramus on MRI, therefore concerning for metastatic bone disease.       MR Pelvis 1. Locally advanced prostate cancer with extracapsular extension to bilateral neurovascular bundles and seminal vesicles as well as extension to the posterior wall of the bladder, abutting the serosa of the anterior rectum, and base of the penis. 2. Bilateral external iliac lymphadenopathy with radiotracer uptake, concerning for metastatic disease. 3. Additional enhancing 9 mm penile corpus spongiosum lesion and left inferior pubic ramus lesion, concerning for metastatic disease. 4. Please see separate dictation for associated PET imaging, for further details on PSMA PET findings. 5. 1.5 cm right lower lobe fat-containing pulmonary nodule, which may represent a hamartoma.   10/25/17 PSA = 19.64  11/27/17 Started bicatlutamide   12/2017 started lupron   01/02/18 Started Abiraterone plus Prednisone (5), decreased to 750mg daily   01/26/18 PSA = 0.6  03/07/18 Finished XRT to the prostate  11/2019 Completed therapy       He reports overall feeling well with no acute complaints.     Past Medical History:   Diagnosis Date    History of kidney stones 09/20/2024    Passed another 3-, 6-       Past Surgical History:   Procedure Laterality Date    RESECTION, PARTIAL SMALL BOWEL  01/2020    OTHER      patella resection    TURP-VAPOR      UMBILICAL HERNIA REPAIR       Social History     Socioeconomic History    Marital status:      Spouse name: Not on file    Number of children: Not on file    Years of education: Not on file    Highest education level: Not on file   Occupational History    Not on file   Tobacco Use    Smoking status: Former     Current packs/day:  0.00     Types: Cigarettes     Quit date:      Years since quittin.9     Passive exposure: Past    Smokeless tobacco: Former   Vaping Use    Vaping status: Never Used   Substance and Sexual Activity    Alcohol use: Not Currently     Alcohol/week: 0.6 oz     Types: 1 Cans of beer per week     Comment: socially    Drug use: Yes     Types: Marijuana     Comment: socially    Sexual activity: Not Currently     Partners: Female   Other Topics Concern    Not on file   Social History Narrative    Not on file     Social Drivers of Health     Financial Resource Strain: Not on file   Food Insecurity: Not on file   Transportation Needs: Not on file   Physical Activity: Not on file   Stress: Not on file   Social Connections: Not on file   Intimate Partner Violence: Not on file   Housing Stability: Not on file       Family History   Problem Relation Age of Onset    Colon Cancer Mother     Glaucoma Father     No Known Problems Sister     No Known Problems Sister     No Known Problems Brother     No Known Problems Daughter     No Known Problems Son     No Known Problems Son        OB History   No obstetric history on file.       Allergies as of 11/15/2024 - Reviewed 11/15/2024   Allergen Reaction Noted    Hydrocodone-acetaminophen Unspecified 10/02/2009    Morphine Unspecified 10/02/2009    Amlodipine Unspecified 2019    Other drug  10/16/2017         Current Outpatient Medications:     allopurinol (ZYLOPRIM) 100 MG Tab, Take 2 Tablets by mouth every day., Disp: 180 Tablet, Rfl: 3    levothyroxine (SYNTHROID) 200 MCG Tab, Take 1 Tablet by mouth every morning on an empty stomach., Disp: 90 Tablet, Rfl: 3    cholestyramine (QUESTRAN) 4 g packet, Take 4 g by mouth 2 times a day., Disp: 1 Each, Rfl: 1    ASCORBIC ACID PO, Take  by mouth every day., Disp: , Rfl:     calcium citrate 315 mg-vitamin D 5 mcg 315-5 MG-MCG per tablet, Take 1,200 mg by mouth every day., Disp: , Rfl:     MULTIPLE VITAMIN PO, Take 1 Tablet by  "mouth every day., Disp: , Rfl:     liothyronine (CYTOMEL) 5 MCG Tab, Take 5 mcg by mouth every day., Disp: , Rfl:     lisinopril (PRINIVIL) 40 MG tablet, Take 1 Tablet by mouth every day., Disp: , Rfl:     Omega-3 Fatty Acids (FISH OIL) 1000 MG Cap capsule, Take 1,000 mg by mouth every day., Disp: , Rfl:     Nutritional Supplements (NUTRITIONAL SUPPLEMENT PO), Take  by mouth. Heal n Soothe, Disp: , Rfl:     metoprolol SR (TOPROL XL) 100 MG TABLET SR 24 HR, Take 2 Tablets by mouth every day., Disp: 135 Tablet, Rfl: 3    Review of Systems:  Review of Systems   Constitutional:  Negative for chills, diaphoresis, fever and weight loss.   Respiratory:  Negative for cough and shortness of breath.    Cardiovascular:  Negative for chest pain and palpitations.   Gastrointestinal:  Negative for heartburn and nausea.   Genitourinary:  Negative for dysuria and urgency.   Musculoskeletal:  Negative for myalgias.   Skin:  Negative for rash.   Neurological:  Negative for dizziness and headaches.       Physical Exam:  Vitals:    11/15/24 1349   BP: (!) 170/94   BP Location: Right arm   Patient Position: Sitting   BP Cuff Size: Adult   Pulse: 63   Temp: 37.2 °C (99 °F)   TempSrc: Temporal   SpO2: 96%   Weight: 108 kg (237 lb 14 oz)   Height: 1.778 m (5' 10\")     Physical Exam  Constitutional:       General: He is not in acute distress.  HENT:      Head: Normocephalic and atraumatic.      Mouth/Throat:      Mouth: Mucous membranes are moist.      Pharynx: Oropharynx is clear.   Eyes:      General: No scleral icterus.     Conjunctiva/sclera: Conjunctivae normal.   Cardiovascular:      Rate and Rhythm: Normal rate.      Pulses: Normal pulses.   Pulmonary:      Effort: Pulmonary effort is normal. No respiratory distress.   Abdominal:      General: There is no distension.      Palpations: Abdomen is soft.      Tenderness: There is no abdominal tenderness.   Musculoskeletal:         General: No tenderness.   Skin:     General: Skin is warm " and dry.   Neurological:      Mental Status: He is alert and oriented to person, place, and time.   Psychiatric:         Mood and Affect: Mood normal.         Thought Content: Thought content normal.         Judgment: Judgment normal.        Labs:  Lab Results   Component Value Date/Time    WBC 5.9 11/18/2024 01:48 PM    RBC 3.87 (L) 11/18/2024 01:48 PM    HEMOGLOBIN 12.4 (L) 11/18/2024 01:48 PM    HEMATOCRIT 38.4 (L) 11/18/2024 01:48 PM    MCV 99.2 (H) 11/18/2024 01:48 PM    MCH 32.0 11/18/2024 01:48 PM    MCHC 32.3 11/18/2024 01:48 PM    MPV 12.6 11/18/2024 01:48 PM    NEUTSPOLYS 68.30 11/18/2024 01:48 PM    LYMPHOCYTES 15.60 (L) 11/18/2024 01:48 PM    MONOCYTES 9.90 11/18/2024 01:48 PM    EOSINOPHILS 4.80 11/18/2024 01:48 PM    BASOPHILS 0.50 11/18/2024 01:48 PM      Lab Results   Component Value Date/Time    SODIUM 140 10/14/2024 12:36 PM    POTASSIUM 4.9 10/14/2024 12:36 PM    CHLORIDE 105 10/14/2024 12:36 PM    CO2 21 10/14/2024 12:36 PM    GLUCOSE 84 10/14/2024 12:36 PM    BUN 34 (H) 10/14/2024 12:36 PM    CREATININE 1.86 (H) 10/14/2024 12:36 PM          Component  Ref Range & Units 1 mo ago   Iron  50 - 180 ug/dL 41 Low    Total Iron Binding  250 - 450 ug/dL 187 Low    Unsat Iron Binding  110 - 370 ug/dL 146   % Saturation  15 - 55 % 22             Component  Ref Range & Units 2 wk ago 1 mo ago   Ferritin  22.0 - 322.0 ng/mL 1149.0 High  1189.        Imaging:   All listed images below have been independently reviewed by me. I agree with the findings as summarized below:    NM-RENAL WITH DIURETIC WASHOUT    Result Date: 11/14/2024 11/14/2024 11:15 AM HISTORY/REASON FOR EXAM:  assess for left renal obstruction; history of likely ureteral stricture following radiotherapy TECHNIQUE/EXAM DESCRIPTION AND NUMBER OF VIEWS: Lasix renal scan with diuretic washout. COMPARISON: None. PROCEDURE: 10.31 mCi technetium 99m MAG3 was injected. Imaging of the kidneys was performed for 20 minutes. The patient then received 20  mg Lasix intravenously, and imaging was continued for a total of 60 minutes. FINDINGS: No demonstrable perfusion to the LEFT kidney. RIGHT kidney shows prompt uptake. No uptake or excretion of radiotracer from LEFT kidney. The split function is 3% on the left and 97% on the right. RIGHT kidney shows excretion of activity after Lasix.  Clearance half time 13.5 minutes.     1.  No demonstrable perfusion to the LEFT kidney or LEFT kidney function.  Cannot evaluate for ureteral obstruction. 2.  RIGHT kidney shows prompt uptake and excretion of radiotracer without obstruction.     Assessment and Plan:   Mr. Moise Cagle is a 68 y.o. male who presents today borderline normocytic anemia,  Hemoglobin 12 and MCV 98.  Iron studies with iron just below threshold for normal, but total iron binding capacity low suggestive of anemia of chronic inflammation in addition to having ferritin greater than thousand.     Will order ESR CRP and soluble transferrin, as well as copper, DARLENE screen and reticulocyte count.    PSA last checked 10/9/2024 and was 0.21 but ferritin at that time already elevated at 1189.  Unclear etiology of inflammation at this time. But suspect 2/2 other comorbidities (HTN, nephrolithiasis, ureteral distention, gout)    Ensure he is up-to-date on all age-appropriate cancer screenings.  PSA last checked a month ago and was normal.  He was referred for colonoscopy by PCP.     Any questions and concerns raised by the patient were answered to the best of my ability. Thank you for allowing me to participate in the care for this patient. Please feel free to contact me for any questions or concerns.

## 2024-11-26 ENCOUNTER — OFFICE VISIT (OUTPATIENT)
Dept: UROLOGY | Facility: MEDICAL CENTER | Age: 69
End: 2024-11-26
Payer: MEDICARE

## 2024-11-26 ENCOUNTER — HOSPITAL ENCOUNTER (OUTPATIENT)
Facility: MEDICAL CENTER | Age: 69
End: 2024-11-26
Attending: UROLOGY
Payer: MEDICARE

## 2024-11-26 DIAGNOSIS — R31.0 GROSS HEMATURIA: ICD-10-CM

## 2024-11-26 DIAGNOSIS — N99.112 POSTPROCEDURAL MEMBRANOUS URETHRAL STRICTURE: ICD-10-CM

## 2024-11-26 DIAGNOSIS — Z90.5 SOLITARY KIDNEY, ACQUIRED: ICD-10-CM

## 2024-11-26 DIAGNOSIS — R30.0 DYSURIA: ICD-10-CM

## 2024-11-26 DIAGNOSIS — N18.9 CHRONIC KIDNEY DISEASE, UNSPECIFIED CKD STAGE: ICD-10-CM

## 2024-11-26 LAB
APPEARANCE UR: ABNORMAL
BACTERIA #/AREA URNS HPF: ABNORMAL /HPF
BILIRUB UR QL STRIP.AUTO: NEGATIVE
CASTS URNS QL MICRO: ABNORMAL /LPF (ref 0–2)
COLOR UR: ABNORMAL
EPITHELIAL CELLS 1715: ABNORMAL /HPF (ref 0–5)
GLUCOSE UR STRIP.AUTO-MCNC: NEGATIVE MG/DL
KETONES UR STRIP.AUTO-MCNC: NEGATIVE MG/DL
LEUKOCYTE ESTERASE UR QL STRIP.AUTO: ABNORMAL
MICRO URNS: ABNORMAL
NITRITE UR QL STRIP.AUTO: NEGATIVE
PH UR STRIP.AUTO: 5.5 [PH] (ref 5–8)
POC POST-VOID: 334 ML
POC PRE-VOID: NORMAL
PROT UR QL STRIP: 100 MG/DL
RBC # URNS HPF: ABNORMAL /HPF (ref 0–2)
RBC UR QL AUTO: ABNORMAL
SP GR UR STRIP.AUTO: 1.01
UROBILINOGEN UR STRIP.AUTO-MCNC: 1 EU/DL
WBC #/AREA URNS HPF: ABNORMAL /HPF

## 2024-11-26 PROCEDURE — 51798 US URINE CAPACITY MEASURE: CPT | Performed by: UROLOGY

## 2024-11-26 PROCEDURE — 87186 SC STD MICRODIL/AGAR DIL: CPT

## 2024-11-26 PROCEDURE — 87086 URINE CULTURE/COLONY COUNT: CPT

## 2024-11-26 PROCEDURE — 99213 OFFICE O/P EST LOW 20 MIN: CPT | Mod: 57 | Performed by: UROLOGY

## 2024-11-26 PROCEDURE — 81001 URINALYSIS AUTO W/SCOPE: CPT

## 2024-11-26 PROCEDURE — 87077 CULTURE AEROBIC IDENTIFY: CPT

## 2024-11-26 NOTE — PROGRESS NOTES
Chief Complaint: membranous urethral stricture, left hydronephrosis    HPI: Moise Cagle is a 68 y.o. male with a history of prostate cancer treated with radiation therapy, and now with a membranous urethral stenosis (see recent retrograde urethrogram from 10/15/24) and a long history of left hydronephrosis. He is here today after a diuretic nuclear renal scan obtained to assess for any functional obstruction on the left side. Unfortunately it appears there is no residual function of the left kidney; I reviewed the study and agree with the radiologist report, which is copied below.     Mr. Cagle has no left flank pain.     Urine flow remains weak, and today his PVR is 334 ml. He has had some intermittent hematuria following the retrograde urethrogram, as well as some dysuria. No fevers or chills.     Past Medical History:  Past Medical History:   Diagnosis Date    History of kidney stones 2024    Passed another 3-, 2015         Past Surgical History:  Past Surgical History:   Procedure Laterality Date    RESECTION, PARTIAL SMALL BOWEL  2020    OTHER      patella resection    TURP-VAPOR      UMBILICAL HERNIA REPAIR         Family History:  Family History   Problem Relation Age of Onset    Colon Cancer Mother     Glaucoma Father     No Known Problems Sister     No Known Problems Sister     No Known Problems Brother     No Known Problems Daughter     No Known Problems Son     No Known Problems Son        Social History:  Social History     Socioeconomic History    Marital status:      Spouse name: Not on file    Number of children: Not on file    Years of education: Not on file    Highest education level: Not on file   Occupational History    Not on file   Tobacco Use    Smoking status: Former     Current packs/day: 0.00     Types: Cigarettes     Quit date:      Years since quittin.9     Passive exposure: Past    Smokeless tobacco: Former   Vaping Use    Vaping status:  Never Used   Substance and Sexual Activity    Alcohol use: Not Currently     Alcohol/week: 0.6 oz     Types: 1 Cans of beer per week     Comment: socially    Drug use: Yes     Types: Marijuana     Comment: socially    Sexual activity: Not Currently     Partners: Female   Other Topics Concern    Not on file   Social History Narrative    Not on file     Social Drivers of Health     Financial Resource Strain: Not on file   Food Insecurity: Not on file   Transportation Needs: Not on file   Physical Activity: Not on file   Stress: Not on file   Social Connections: Not on file   Intimate Partner Violence: Not on file   Housing Stability: Not on file       Medications:  Current Outpatient Medications   Medication Sig Dispense Refill    metoprolol SR (TOPROL XL) 100 MG TABLET SR 24 HR Take 2 Tablets by mouth every day. 135 Tablet 3    allopurinol (ZYLOPRIM) 100 MG Tab Take 2 Tablets by mouth every day. 180 Tablet 3    levothyroxine (SYNTHROID) 200 MCG Tab Take 1 Tablet by mouth every morning on an empty stomach. 90 Tablet 3    cholestyramine (QUESTRAN) 4 g packet Take 4 g by mouth 2 times a day. 1 Each 1    ASCORBIC ACID PO Take  by mouth every day.      calcium citrate 315 mg-vitamin D 5 mcg 315-5 MG-MCG per tablet Take 1,200 mg by mouth every day.      MULTIPLE VITAMIN PO Take 1 Tablet by mouth every day.      liothyronine (CYTOMEL) 5 MCG Tab Take 5 mcg by mouth every day.      lisinopril (PRINIVIL) 40 MG tablet Take 1 Tablet by mouth every day.      Omega-3 Fatty Acids (FISH OIL) 1000 MG Cap capsule Take 1,000 mg by mouth every day.      Nutritional Supplements (NUTRITIONAL SUPPLEMENT PO) Take  by mouth. Heal n Soothe       No current facility-administered medications for this visit.       Allergies:  Allergies   Allergen Reactions    Hydrocodone-Acetaminophen Unspecified     Teeth grinding anxious (vicodin)    Teeth grinding anxious (vicodin)   Anxiety    Morphine Unspecified     Hallucination     Hallucination     halucinates    Amlodipine Unspecified     Swelling of feet and ankles    Other Drug      Hay fever reaction Runny nose, cough       Review of Systems:  Constitutional: Negative for fever, chills and malaise/fatigue.   HENT: Negative for congestion.    Eyes: Negative for pain.   Respiratory: Negative for cough and shortness of breath.    Cardiovascular: Negative for leg swelling.   Gastrointestinal: Negative for nausea, vomiting, abdominal pain and diarrhea.   Genitourinary: Negative for dysuria and hematuria.   Skin: Negative for rash.   Neurological: Negative for dizziness, focal weakness and headaches.   Endo/Heme/Allergies: Does not bruise/bleed easily.   Psychiatric/Behavioral: Negative for depression.  The patient is not nervous/anxious.        Physical Exam:  There were no vitals filed for this visit.    GENERAL: well appearing, well nourished, NAD  RESP: respiratory effort normal  ABDOMEN: soft, nontender, nondistended, no masses or organomegaly  HERNIAS: no hernias found on exam  SKIN/LYMPH: normal coloration and turgor, no suspicious skin lesions noted  NEURO/PSYCH: alert, oriented, normal speech, no focal findings or movement disorder noted  EXTREMITIES: no pedal edema noted  PVR: 334 mL  (bladder scanner)     Data Review:    Labs:  POCT UA   Lab Results   Component Value Date/Time    POCCOLOR light yello 10/15/2024 01:58 PM    POCAPPEAR turbid 10/15/2024 01:58 PM    POCLEUKEST large 10/15/2024 01:58 PM    POCNITRITE neg 10/15/2024 01:58 PM    POCUROBILIGE 0.2 10/15/2024 01:58 PM    POCPROTEIN 30 10/15/2024 01:58 PM    POCURPH 6.0 10/15/2024 01:58 PM    POCBLOOD moderate 10/15/2024 01:58 PM    POCSPGRV 1.015 10/15/2024 01:58 PM    POCKETONES neg 10/15/2024 01:58 PM    POCBILIRUBIN neg 10/15/2024 01:58 PM    POCGLUCUA neg 10/15/2024 01:58 PM      CBC   Lab Results   Component Value Date/Time    WBC 5.9 11/18/2024 1348    RBC 3.87 (L) 11/18/2024 1348    HEMOGLOBIN 12.4 (L) 11/18/2024 1348    HEMATOCRIT 38.4  (L) 11/18/2024 1348    MCV 99.2 (H) 11/18/2024 1348    MCH 32.0 11/18/2024 1348    MCHC 32.3 11/18/2024 1348    RDW 52.7 (H) 11/18/2024 1348    MPV 12.6 11/18/2024 1348    LYMPHOCYTES 15.60 (L) 11/18/2024 1348    LYMPHS 0.91 (L) 11/18/2024 1348    MONOCYTES 9.90 11/18/2024 1348    MONOS 0.58 11/18/2024 1348    EOSINOPHILS 4.80 11/18/2024 1348    EOS 0.28 11/18/2024 1348    BASOPHILS 0.50 11/18/2024 1348    BASO 0.03 11/18/2024 1348    NRBC 0.00 11/18/2024 1348       CMP   Lab Results   Component Value Date/Time    SODIUM 140 10/14/2024 1236    POTASSIUM 4.9 10/14/2024 1236    CHLORIDE 105 10/14/2024 1236    CO2 21 10/14/2024 1236    ANION 14.0 10/14/2024 1236    GLUCOSE 84 10/14/2024 1236    BUN 34 (H) 10/14/2024 1236    CREATININE 1.86 (H) 10/14/2024 1236    GFRCKD 38 (A) 10/14/2024 1236    CALCIUM 10.5 10/14/2024 1236    CORRCALC 10.5 10/14/2024 1236    ASTSGOT 13 10/14/2024 1236    ALTSGPT 7 10/14/2024 1236    ALKPHOSPHAT 72 10/14/2024 1236    TBILIRUBIN 0.4 10/14/2024 1236    ALBUMIN 4.0 10/14/2024 1236    TOTPROTEIN 7.4 10/14/2024 1236    GLOBULIN 3.4 10/14/2024 1236    AGRATIO 1.2 10/14/2024 1236       Imaging:   NM-RENAL WITH DIURETIC WASHOUT  Order: [id number]   Status: Final result       Visible to patient: Yes (seen)       Next appt: Today at 02:45 PM in Urology (Castro Burleson M.D.)       Dx: Ureteral stricture    0 Result Notes  Details    Reading Physician Reading Date Result Priority   Brooks Treadwell M.D.  [phone] 11/14/2024      Narrative & Impression     11/14/2024 11:15 AM     HISTORY/REASON FOR EXAM:  assess for left renal obstruction; history of likely ureteral stricture following radiotherapy     TECHNIQUE/EXAM DESCRIPTION AND NUMBER OF VIEWS:  Lasix renal scan with diuretic washout.     COMPARISON: None.     PROCEDURE:  10.31 mCi technetium 99m MAG3 was injected. Imaging of the kidneys was performed for 20 minutes. The patient then received 20 mg Lasix intravenously, and imaging was  continued for a total of 60 minutes.     FINDINGS:  No demonstrable perfusion to the LEFT kidney.  RIGHT kidney shows prompt uptake.  No uptake or excretion of radiotracer from LEFT kidney.     The split function is 3% on the left and 97% on the right.     RIGHT kidney shows excretion of activity after Lasix.  Clearance half time 13.5 minutes.     IMPRESSION:     1.  No demonstrable perfusion to the LEFT kidney or LEFT kidney function.  Cannot evaluate for ureteral obstruction.  2.  RIGHT kidney shows prompt uptake and excretion of radiotracer without obstruction.       US ABDOMEN RETROPERITONEAL  Order: [id number]  Narrative    COMPARISON: Renal ultrasound 4/17/2021 and CT abdomen and pelvis without contrast 5/29/2020    INDICATIONS: Left hydronephrosis    TECHNIQUE: Real-time sonographic examination was performed of the relevant area of interest.      FINDINGS:    RIGHT KIDNEY: The right kidney measures 12.9 cm. There is a cyst at the midpole measuring 2.2 cm, previously 2.5 cm. There is a 12 mm stone at the inferior pole. No hydronephrosis.    LEFT KIDNEY: The left kidney measures 13.5 cm. There is stable severe hydronephrosis with cortical thinning.    BLADDER: Only the right ureteral jet was visualized.    OTHER: None    CONCLUSION: Stable severe left hydronephrosis with cortical thinning.    Stable right renal cyst and nonobstructing stone.     Assessment: 68 y.o.male with a history of radiation related membranous urethral stenosis, as well as chronic left hydronephrosis, in the setting of prior treatment of prostate cancer. Nuclear renal scan demonstrates no perfusion or function of the left kidney, which is asymptomatic. He does have CKD, with his latest eGFR at 38.     We also discussed management of the membranous stricture again. He has had some progressive difficulty emptying, as well as urge and overflow incontinence. He would like to proceed with treatment, and after discussing options he would like to  go with DVIU and Optilume dilation.       Plan:    -No intervention indicted for left kidney given lack of function  -Urinalysis with reflex to culture given recent hematuria and dysuria  -Schedule cystoscopy with DVIU and DCB dilation of membranous urethral stenosis  -Referral to nephrology to establish care for solitary kidney and CKD  -Follow up 5 days postop for catheter removal    Castro Burleson MD

## 2024-11-27 ENCOUNTER — HOSPITAL ENCOUNTER (OUTPATIENT)
Dept: HEMATOLOGY ONCOLOGY | Facility: MEDICAL CENTER | Age: 69
End: 2024-11-27
Attending: STUDENT IN AN ORGANIZED HEALTH CARE EDUCATION/TRAINING PROGRAM
Payer: MEDICARE

## 2024-11-27 ENCOUNTER — TELEPHONE (OUTPATIENT)
Dept: UROLOGY | Facility: MEDICAL CENTER | Age: 69
End: 2024-11-27

## 2024-11-27 DIAGNOSIS — D64.9 NORMOCYTIC ANEMIA: ICD-10-CM

## 2024-11-27 DIAGNOSIS — N30.01 ACUTE CYSTITIS WITH HEMATURIA: ICD-10-CM

## 2024-11-27 ASSESSMENT — ENCOUNTER SYMPTOMS
PALPITATIONS: 0
COUGH: 0
DIAPHORESIS: 0
NAUSEA: 0
DIZZINESS: 0
HEADACHES: 0
CHILLS: 0
MYALGIAS: 0
HEARTBURN: 0
FEVER: 0
SHORTNESS OF BREATH: 0
WEIGHT LOSS: 0

## 2024-11-27 NOTE — TELEPHONE ENCOUNTER
I called Mr. Cagle to discuss his urinalysis result, positive for WBC, RBC, and bacteria. Culture is still pending. Will treat empirically given these findings and the symptoms he described in the office yesterday, and I'll let him know if we need to switch the antibiotic when the final culture results.

## 2024-11-27 NOTE — PROGRESS NOTES
Clinic Note :  Hematology/Oncology    This evaluation was conducted via Teams using secure and encrypted videoconferencing technology. The patient was in their home in the [facility].    The patient's identity was confirmed and verbal consent was obtained for this virtual visit.     Primary Care:  Debbie Mays P.A.-C.    Diagnosis:   Elevated Ferritin,     Chief Complaint:  Follow Up, Discuss lab results    History of Presenting Illness:  Moise Cagle is a 68 y.o. male with PMH Prostate Cancer, hypertension, nephrolithiasis, hypothyroidism who presents today to establish care with concern for hyper ferritinemia and borderline macrocytic anemia.  Patient has had anemia documented as far back as 4 years ago, which was the time of his prostate cancer diagnosis.  Previously was taking iron supplements but was told to discontinue them.  Iron studies were done in October Tober 2024 with iron studies low at 41 but iron binding capacity low at 187.  Ferritin elevated at 1189.  B12 elevated.  Patient seen anemia has been documented for him as far back as 2009.    In regards to his prostate cancer he was sent to urology after elevated PSA.  Prostate cancer history is as follows:  09/29/2015 PSA 2.3  05/03/2017 PSA 6.9    06/03/2017 Presented to doctor with hematuria, LUTS (severe urinary frequency, urgency, nocturia, difficulty voiding).   07/03/2017 PSA 7.9; CHANDANA was distinctly abnormal, firm, and indurated with extension into the left SV.   07/25/2017 CT abdomen pelvis negative for distant metastatic disease or lymphadenopathy.   08/11/2017 Dr. Adan performed cystoscopy and found papillary neoplasm within urethra suspicious for urothelial carcinoma.   09/18/2017 Underwent TURBT and prostate biopsy. Pathology returned showing Faby 5+5 prostate cancer in 7/7 cores, including a core at the prostatic urethra.    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  10/04/2017 Bone  scan negative for metastatic disease.   10/16/2017 Seen by Dr. Maynard in Urology at [facility].     11/03/2017 PSMA PET Small bilateral anterior right and left external iliac lymph nodes demonstrate mildly increased PSMA activity concerning for metastatic disease. Heterogeneously increased PSMA activity in the prostate gland, known to represent the patient's prostate cancer. Very tiny focus of PSMA anterior and to the left of the prostate gland, which seems to correlate with a 7 mm enhancing lesion in the left inferior pubic ramus on MRI, therefore concerning for metastatic bone disease.       MR Pelvis 1. Locally advanced prostate cancer with extracapsular extension to bilateral neurovascular bundles and seminal vesicles as well as extension to the posterior wall of the bladder, abutting the serosa of the anterior rectum, and base of the penis. 2. Bilateral external iliac lymphadenopathy with radiotracer uptake, concerning for metastatic disease. 3. Additional enhancing 9 mm penile corpus spongiosum lesion and left inferior pubic ramus lesion, concerning for metastatic disease. 4. Please see separate dictation for associated PET imaging, for further details on PSMA PET findings. 5. 1.5 cm right lower lobe fat-containing pulmonary nodule, which may represent a hamartoma.   10/25/17 PSA = 19.64  11/27/17 Started bicatlutamide   12/2017 started lupron   01/02/18 Started Abiraterone plus Prednisone (5), decreased to 750mg daily   01/26/18 PSA = 0.6  03/07/18 Finished XRT to the prostate  11/2019 Completed therapy     Interval History 11/27/24: He reports being feeling well today.  He saw urology yesterday and was diagnosed with a UTI so was currently on antibiotics.  Denies fever or chills.  Otherwise no new changes since last visit    Past Medical History:   Diagnosis Date    History of kidney stones 09/20/2024    Passed another 3-, 6-       Past Surgical History:   Procedure Laterality Date    RESECTION,  PARTIAL SMALL BOWEL  2020    OTHER      patella resection    TURP-VAPOR      UMBILICAL HERNIA REPAIR       Social History     Socioeconomic History    Marital status:      Spouse name: Not on file    Number of children: Not on file    Years of education: Not on file    Highest education level: Not on file   Occupational History    Not on file   Tobacco Use    Smoking status: Former     Current packs/day: 0.00     Types: Cigarettes     Quit date:      Years since quittin.9     Passive exposure: Past    Smokeless tobacco: Former   Vaping Use    Vaping status: Never Used   Substance and Sexual Activity    Alcohol use: Not Currently     Alcohol/week: 0.6 oz     Types: 1 Cans of beer per week     Comment: socially    Drug use: Yes     Types: Marijuana     Comment: socially    Sexual activity: Not Currently     Partners: Female   Other Topics Concern    Not on file   Social History Narrative    Not on file     Social Drivers of Health     Financial Resource Strain: Not on file   Food Insecurity: Not on file   Transportation Needs: Not on file   Physical Activity: Not on file   Stress: Not on file   Social Connections: Not on file   Intimate Partner Violence: Not on file   Housing Stability: Not on file       Family History   Problem Relation Age of Onset    Colon Cancer Mother     Glaucoma Father     No Known Problems Sister     No Known Problems Sister     No Known Problems Brother     No Known Problems Daughter     No Known Problems Son     No Known Problems Son        OB History   No obstetric history on file.       Allergies as of 2024 - Reviewed 2024   Allergen Reaction Noted    Hydrocodone-acetaminophen Unspecified 10/02/2009    Morphine Unspecified 10/02/2009    Amlodipine Unspecified 2019    Other drug  10/16/2017         Current Outpatient Medications:     amoxicillin-clavulanate (AUGMENTIN) 875-125 MG Tab, Take 1 Tablet by mouth 2 times a day for 7 days., Disp: 14 Tablet,  Rfl: 0    metoprolol SR (TOPROL XL) 100 MG TABLET SR 24 HR, Take 2 Tablets by mouth every day., Disp: 135 Tablet, Rfl: 3    allopurinol (ZYLOPRIM) 100 MG Tab, Take 2 Tablets by mouth every day., Disp: 180 Tablet, Rfl: 3    levothyroxine (SYNTHROID) 200 MCG Tab, Take 1 Tablet by mouth every morning on an empty stomach., Disp: 90 Tablet, Rfl: 3    cholestyramine (QUESTRAN) 4 g packet, Take 4 g by mouth 2 times a day., Disp: 1 Each, Rfl: 1    ASCORBIC ACID PO, Take  by mouth every day., Disp: , Rfl:     calcium citrate 315 mg-vitamin D 5 mcg 315-5 MG-MCG per tablet, Take 1,200 mg by mouth every day., Disp: , Rfl:     MULTIPLE VITAMIN PO, Take 1 Tablet by mouth every day., Disp: , Rfl:     liothyronine (CYTOMEL) 5 MCG Tab, Take 5 mcg by mouth every day., Disp: , Rfl:     lisinopril (PRINIVIL) 40 MG tablet, Take 1 Tablet by mouth every day., Disp: , Rfl:     Omega-3 Fatty Acids (FISH OIL) 1000 MG Cap capsule, Take 1,000 mg by mouth every day., Disp: , Rfl:     Nutritional Supplements (NUTRITIONAL SUPPLEMENT PO), Take  by mouth. Heal n Soothe, Disp: , Rfl:     Review of Systems:  Review of Systems   Constitutional:  Negative for chills, diaphoresis, fever and weight loss.   Respiratory:  Negative for cough and shortness of breath.    Cardiovascular:  Negative for chest pain and palpitations.   Gastrointestinal:  Negative for heartburn and nausea.   Genitourinary:  Negative for dysuria and urgency.   Musculoskeletal:  Negative for myalgias.   Skin:  Negative for rash.   Neurological:  Negative for dizziness and headaches.       Physical Exam:  Vitals and PE deferred 2/2 Virtual Visit     Labs:  Lab Results   Component Value Date/Time    WBC 5.9 11/18/2024 01:48 PM    RBC 3.87 (L) 11/18/2024 01:48 PM    HEMOGLOBIN 12.4 (L) 11/18/2024 01:48 PM    HEMATOCRIT 38.4 (L) 11/18/2024 01:48 PM    MCV 99.2 (H) 11/18/2024 01:48 PM    MCH 32.0 11/18/2024 01:48 PM    MCHC 32.3 11/18/2024 01:48 PM    MPV 12.6 11/18/2024 01:48 PM     NEUTSPOLYS 68.30 11/18/2024 01:48 PM    LYMPHOCYTES 15.60 (L) 11/18/2024 01:48 PM    MONOCYTES 9.90 11/18/2024 01:48 PM    EOSINOPHILS 4.80 11/18/2024 01:48 PM    BASOPHILS 0.50 11/18/2024 01:48 PM      Lab Results   Component Value Date/Time    SODIUM 140 10/14/2024 12:36 PM    POTASSIUM 4.9 10/14/2024 12:36 PM    CHLORIDE 105 10/14/2024 12:36 PM    CO2 21 10/14/2024 12:36 PM    GLUCOSE 84 10/14/2024 12:36 PM    BUN 34 (H) 10/14/2024 12:36 PM    CREATININE 1.86 (H) 10/14/2024 12:36 PM          Component  Ref Range & Units 1 mo ago   Iron  50 - 180 ug/dL 41 Low    Total Iron Binding  250 - 450 ug/dL 187 Low    Unsat Iron Binding  110 - 370 ug/dL 146   % Saturation  15 - 55 % 22             Component  Ref Range & Units 2 wk ago 1 mo ago   Ferritin  22.0 - 322.0 ng/mL 1149.0 High  1189.            Component  Ref Range & Units 9 d ago   C Reactive Protein High Sensitive  0.0 - 3.0 mg/L 31.0 High             Component  Ref Range & Units 9 d ago   Sed Rate Westergren  0 - 20 mm/hour 62 High         Imaging:   All listed images below have been independently reviewed by me. I agree with the findings as summarized below:    NM-RENAL WITH DIURETIC WASHOUT    Result Date: 11/14/2024 11/14/2024 11:15 AM HISTORY/REASON FOR EXAM:  assess for left renal obstruction; history of likely ureteral stricture following radiotherapy TECHNIQUE/EXAM DESCRIPTION AND NUMBER OF VIEWS: Lasix renal scan with diuretic washout. COMPARISON: None. PROCEDURE: 10.31 mCi technetium 99m MAG3 was injected. Imaging of the kidneys was performed for 20 minutes. The patient then received 20 mg Lasix intravenously, and imaging was continued for a total of 60 minutes. FINDINGS: No demonstrable perfusion to the LEFT kidney. RIGHT kidney shows prompt uptake. No uptake or excretion of radiotracer from LEFT kidney. The split function is 3% on the left and 97% on the right. RIGHT kidney shows excretion of activity after Lasix.  Clearance half time 13.5 minutes.      1.  No demonstrable perfusion to the LEFT kidney or LEFT kidney function.  Cannot evaluate for ureteral obstruction. 2.  RIGHT kidney shows prompt uptake and excretion of radiotracer without obstruction.     Assessment and Plan:   Mr. Moise Cagle is a 68 y.o. male who presents today borderline normocytic anemia,  Hemoglobin 12 and MCV 98.  Iron studies with iron just below threshold for normal, but total iron binding capacity low suggestive of anemia of chronic inflammation in addition to having ferritin greater than thousand.     ESR and CRP both elevated, soluble transferrin suggestive of anemia of chronic inflammation and iron deficiency.  Iron profile on 11/1/2024 also with mildly low iron, however low iron binding capacity also suggestive of combined anemia.     PSA last checked 10/9/2024 and was 0.21 but ferritin at that time already elevated at 1189.  Unclear etiology of inflammation at this time. But suspect 2/2 other comorbidities (HTN, nephrolithiasis, ureteral distention, gout).  Checked DARLENE was negative.    Ensure he is up-to-date on all age-appropriate cancer screenings, reports he has colonoscopy scheduled in February.  PSA last checked a month ago and was normal.  He was referred for colonoscopy by PCP.     Can consider iron supplement however I suspect that this is a mild anemia as well related to chronic inflammation, workup for this is ongoing.    Any questions and concerns raised by the patient were answered to the best of my ability. Thank you for allowing me to participate in the care for this patient. Please feel free to contact me for any questions or concerns.

## 2024-11-30 LAB
BACTERIA UR CULT: ABNORMAL
BACTERIA UR CULT: ABNORMAL
SIGNIFICANT IND 70042: ABNORMAL
SITE SITE: ABNORMAL
SOURCE SOURCE: ABNORMAL

## 2024-12-05 DIAGNOSIS — Z90.5 SOLITARY KIDNEY, ACQUIRED: ICD-10-CM

## 2024-12-05 DIAGNOSIS — N18.32 CKD STAGE 3B, GFR 30-44 ML/MIN: ICD-10-CM

## 2024-12-07 ENCOUNTER — HOSPITAL ENCOUNTER (OUTPATIENT)
Dept: LAB | Facility: MEDICAL CENTER | Age: 69
End: 2024-12-07
Attending: UROLOGY
Payer: MEDICARE

## 2024-12-07 DIAGNOSIS — N18.32 CKD STAGE 3B, GFR 30-44 ML/MIN: ICD-10-CM

## 2024-12-07 DIAGNOSIS — Z90.5 SOLITARY KIDNEY, ACQUIRED: ICD-10-CM

## 2024-12-07 LAB
ANION GAP SERPL CALC-SCNC: 8 MMOL/L (ref 7–16)
BUN SERPL-MCNC: 32 MG/DL (ref 8–22)
CALCIUM SERPL-MCNC: 10.2 MG/DL (ref 8.5–10.5)
CHLORIDE SERPL-SCNC: 110 MMOL/L (ref 96–112)
CO2 SERPL-SCNC: 20 MMOL/L (ref 20–33)
CREAT SERPL-MCNC: 1.67 MG/DL (ref 0.5–1.4)
GFR SERPLBLD CREATININE-BSD FMLA CKD-EPI: 43 ML/MIN/1.73 M 2
GLUCOSE SERPL-MCNC: 89 MG/DL (ref 65–99)
POTASSIUM SERPL-SCNC: 5.1 MMOL/L (ref 3.6–5.5)
SODIUM SERPL-SCNC: 138 MMOL/L (ref 135–145)

## 2024-12-07 PROCEDURE — 82570 ASSAY OF URINE CREATININE: CPT

## 2024-12-07 PROCEDURE — 80048 BASIC METABOLIC PNL TOTAL CA: CPT

## 2024-12-07 PROCEDURE — 84156 ASSAY OF PROTEIN URINE: CPT

## 2024-12-07 PROCEDURE — 82043 UR ALBUMIN QUANTITATIVE: CPT

## 2024-12-07 PROCEDURE — 36415 COLL VENOUS BLD VENIPUNCTURE: CPT

## 2024-12-09 LAB
CREAT UR-MCNC: 67.14 MG/DL
MICROALBUMIN UR-MCNC: 9.7 MG/DL
MICROALBUMIN/CREAT UR: 144 MG/G (ref 0–30)
PROT UR-MCNC: 25 MG/DL (ref 0–15)

## 2024-12-11 ENCOUNTER — TELEPHONE (OUTPATIENT)
Dept: UROLOGY | Facility: MEDICAL CENTER | Age: 69
End: 2024-12-11
Payer: MEDICARE

## 2024-12-11 NOTE — TELEPHONE ENCOUNTER
Spoke with pt in regards to scheduling OR procedure with Dr. Burleson, pt requested Monday's if possible which is a clinic day for MD. Staff offered 01//08/25, 01/22/25 and to look into February. Pt declined and stated he would like to verify transporation and call back. Provided pt with my name and our direct office number to move forward when he is ready.

## 2024-12-16 ENCOUNTER — TELEPHONE (OUTPATIENT)
Dept: UROLOGY | Facility: MEDICAL CENTER | Age: 69
End: 2024-12-16
Payer: MEDICARE

## 2024-12-16 RX ORDER — LIOTHYRONINE SODIUM 5 UG/1
5 TABLET ORAL
Qty: 90 TABLET | Refills: 0 | Status: SHIPPED | OUTPATIENT
Start: 2024-12-16

## 2024-12-18 ENCOUNTER — APPOINTMENT (OUTPATIENT)
Dept: ADMISSIONS | Facility: MEDICAL CENTER | Age: 69
End: 2024-12-18
Attending: UROLOGY
Payer: MEDICARE

## 2024-12-19 ENCOUNTER — PRE-ADMISSION TESTING (OUTPATIENT)
Dept: ADMISSIONS | Facility: MEDICAL CENTER | Age: 69
End: 2024-12-19
Attending: UROLOGY
Payer: MEDICARE

## 2024-12-19 DIAGNOSIS — Z01.810 PRE-OPERATIVE CARDIOVASCULAR EXAMINATION: ICD-10-CM

## 2024-12-19 DIAGNOSIS — Z01.812 PRE-OPERATIVE LABORATORY EXAMINATION: ICD-10-CM

## 2024-12-19 NOTE — OR NURSING
PreAdmit Telephone Appointment: Reviewed the Preparing for your procedure handout with patient over the phone. Patient instructed per pharmacy guidelines regarding taking, holding or contacting provider for instructions on regularly prescribed medications before surgery. Instructed to take the following medications the day of surgery with a sip of water per pharmacy medication guidelines: Metoprolol, Allopurinol, Levothyroxine, Liothyronine  Pt denies issues with anesthesia      Confirmed with patient where to check in morning of surgery. Handouts/Brochure/Video emailed to patient.

## 2024-12-19 NOTE — PREADMIT AVS NOTE
Current Medications   Medication Instructions    liothyronine (CYTOMEL) 5 MCG Tab Continue taking medication as prescribed, including morning of procedure     metoprolol SR (TOPROL XL) 100 MG TABLET SR 24 HR Continue taking medication as prescribed, including morning of procedure     allopurinol (ZYLOPRIM) 100 MG Tab Continue taking medication as prescribed, including morning of procedure     levothyroxine (SYNTHROID) 200 MCG Tab Continue taking medication as prescribed, including morning of procedure     cholestyramine (QUESTRAN) 4 g packet Stop 24 hours before surgery    ASCORBIC ACID PO Stop 7 days before surgery    calcium citrate 315 mg-vitamin D 5 mcg 315-5 MG-MCG per tablet Stop 7 days before surgery    MULTIPLE VITAMIN PO Stop 7 days before surgery    lisinopril (PRINIVIL) 40 MG tablet Stop 24 hours before surgery    Omega-3 Fatty Acids (FISH OIL) 1000 MG Cap capsule Stop 7 days before surgery    Nutritional Supplements (NUTRITIONAL SUPPLEMENT PO) Stop 7 days before surgery

## 2024-12-20 ENCOUNTER — OFFICE VISIT (OUTPATIENT)
Dept: MEDICAL GROUP | Age: 69
End: 2024-12-20
Payer: MEDICARE

## 2024-12-20 VITALS
WEIGHT: 238 LBS | DIASTOLIC BLOOD PRESSURE: 86 MMHG | OXYGEN SATURATION: 96 % | BODY MASS INDEX: 34.07 KG/M2 | HEIGHT: 70 IN | HEART RATE: 67 BPM | TEMPERATURE: 98 F | SYSTOLIC BLOOD PRESSURE: 150 MMHG

## 2024-12-20 DIAGNOSIS — N13.5 URETERAL STRICTURE: ICD-10-CM

## 2024-12-20 DIAGNOSIS — I10 ESSENTIAL HYPERTENSION, BENIGN: ICD-10-CM

## 2024-12-20 DIAGNOSIS — R19.7 DIARRHEA, UNSPECIFIED TYPE: ICD-10-CM

## 2024-12-20 PROCEDURE — 3077F SYST BP >= 140 MM HG: CPT | Performed by: PHYSICIAN ASSISTANT

## 2024-12-20 PROCEDURE — 99214 OFFICE O/P EST MOD 30 MIN: CPT | Performed by: PHYSICIAN ASSISTANT

## 2024-12-20 PROCEDURE — 3079F DIAST BP 80-89 MM HG: CPT | Performed by: PHYSICIAN ASSISTANT

## 2024-12-20 RX ORDER — METOPROLOL SUCCINATE 100 MG/1
150 TABLET, EXTENDED RELEASE ORAL DAILY
Qty: 135 TABLET | Refills: 3
Start: 2024-12-20

## 2024-12-20 RX ORDER — CHOLESTYRAMINE LIGHT 4 G/5.7G
POWDER, FOR SUSPENSION ORAL
COMMUNITY
Start: 2024-10-09

## 2024-12-20 RX ORDER — HYDROCHLOROTHIAZIDE 25 MG/1
25 TABLET ORAL DAILY
Qty: 30 TABLET | Refills: 2 | Status: SHIPPED | OUTPATIENT
Start: 2024-12-20

## 2024-12-20 ASSESSMENT — FIBROSIS 4 INDEX: FIB4 SCORE: 1.67

## 2024-12-20 NOTE — PROGRESS NOTES
cc: Follow-up hypertension    Subjective:     HPI    History of Present Illness  The patient is a 69-year-old male presenting for a follow-up of hypertension.    He is currently on 40 mg of lisinopril. Metoprolol was increased to 200 mg.  Blood pressure readings have been consistently reading between 120-140 systolic, they are improving.  However heart rates between 50-60, he is starting to note intermittent lightheadedness, fatigue.  Also when he increased the dose noted presentation of diarrhea.  He does not tolerate amlodipine.    Of note he is scheduled for cystoscopy procedure inpatient due to ureteral stricture 1/8.      Review of systems:  See above.       Current Outpatient Medications:     cholestyramine light (PREVALITE) 4 GM/DOSE powder, , Disp: , Rfl:     metoprolol SR (TOPROL XL) 100 MG TABLET SR 24 HR, Take 1.5 Tablets by mouth every day., Disp: 135 Tablet, Rfl: 3    hydroCHLOROthiazide 25 MG Tab, Take 1 Tablet by mouth every day., Disp: 30 Tablet, Rfl: 2    liothyronine (CYTOMEL) 5 MCG Tab, TAKE 1 TABLET BY MOUTH EVERY DAY, Disp: 90 Tablet, Rfl: 0    allopurinol (ZYLOPRIM) 100 MG Tab, Take 2 Tablets by mouth every day. (Patient taking differently: Take 200 mg by mouth every morning.), Disp: 180 Tablet, Rfl: 3    levothyroxine (SYNTHROID) 200 MCG Tab, Take 1 Tablet by mouth every morning on an empty stomach., Disp: 90 Tablet, Rfl: 3    cholestyramine (QUESTRAN) 4 g packet, Take 4 g by mouth 2 times a day., Disp: 1 Each, Rfl: 1    ASCORBIC ACID PO, Take  by mouth every day., Disp: , Rfl:     calcium citrate 315 mg-vitamin D 5 mcg 315-5 MG-MCG per tablet, Take 1,200 mg by mouth every day., Disp: , Rfl:     MULTIPLE VITAMIN PO, Take 1 Tablet by mouth every day., Disp: , Rfl:     lisinopril (PRINIVIL) 40 MG tablet, Take 1 Tablet by mouth every morning., Disp: , Rfl:     Omega-3 Fatty Acids (FISH OIL) 1000 MG Cap capsule, Take 1,000 mg by mouth every day., Disp: , Rfl:     Nutritional Supplements  "(NUTRITIONAL SUPPLEMENT PO), Take  by mouth. Heal n Soothe, Disp: , Rfl:     Allergies, past medical history, past surgical history, family history, social history reviewed and updated    Objective:     Vitals: BP (!) 150/86 (BP Location: Left arm, Patient Position: Sitting, BP Cuff Size: Adult)   Pulse 67   Temp 36.7 °C (98 °F) (Temporal)   Ht 1.778 m (5' 10\")   Wt 108 kg (238 lb)   SpO2 96%   BMI 34.15 kg/m²   General: Alert, pleasant, NAD  HEENT: Normocephalic. Neck supple.  No carotid bruits   Heart: Regular rate and rhythm.  S1 and S2 normal.  No murmurs appreciated.  Respiratory: Normal respiratory effort.  Clear to auscultation bilaterally.  Skin: Warm, dry, no rashes.  Psych:  Affect/mood is normal, judgement is good, memory is intact, grooming is appropriate.    Assessment/Plan:     Luis was seen today for hypertension follow-up.    Diagnoses and all orders for this visit:    Essential hypertension, benign  -Blood pressure readings have been improving, however did not tolerate the increase of metoprolol to 200 mg with lightheadedness, dizziness, also most likely diarrhea.  Will decrease metoprolol back down to 150 mg add on 25 mg of hydrochlorothiazide.  Also placed referral to cardiology in the event blood pressure is not controlled with the addition of the hydrochlorothiazide.  Will follow-up in 2 to 3 weeks.  -     metoprolol SR (TOPROL XL) 100 MG TABLET SR 24 HR; Take 1.5 Tablets by mouth every day.  -     hydroCHLOROthiazide 25 MG Tab; Take 1 Tablet by mouth every day.    Diarrhea, unspecified type  Onset since increasing metoprolol to 100 mg, most likely medication side effect.  Decreasing the Toprol back down to 150 mg.    Ureteral stricture  Currently followed by urology.  Scheduled for cystoscopy 1/8      Return in about 3 weeks (around 1/10/2025) for HTN.  "

## 2024-12-31 ENCOUNTER — PRE-ADMISSION TESTING (OUTPATIENT)
Dept: ADMISSIONS | Facility: MEDICAL CENTER | Age: 69
End: 2024-12-31
Attending: UROLOGY
Payer: MEDICARE

## 2024-12-31 DIAGNOSIS — Z01.810 PRE-OPERATIVE CARDIOVASCULAR EXAMINATION: ICD-10-CM

## 2024-12-31 DIAGNOSIS — Z01.812 PRE-OPERATIVE LABORATORY EXAMINATION: ICD-10-CM

## 2024-12-31 LAB
APPEARANCE UR: ABNORMAL
BACTERIA #/AREA URNS HPF: ABNORMAL /HPF
BILIRUB UR QL STRIP.AUTO: NEGATIVE
CASTS URNS QL MICRO: ABNORMAL /LPF (ref 0–2)
COLOR UR: YELLOW
EKG IMPRESSION: NORMAL
EPITHELIAL CELLS 1715: ABNORMAL /HPF (ref 0–5)
GLUCOSE UR STRIP.AUTO-MCNC: NEGATIVE MG/DL
KETONES UR STRIP.AUTO-MCNC: NEGATIVE MG/DL
LEUKOCYTE ESTERASE UR QL STRIP.AUTO: ABNORMAL
MICRO URNS: ABNORMAL
NITRITE UR QL STRIP.AUTO: NEGATIVE
PH UR STRIP.AUTO: 6 [PH] (ref 5–8)
PROT UR QL STRIP: NEGATIVE MG/DL
RBC # URNS HPF: ABNORMAL /HPF
RBC UR QL AUTO: ABNORMAL
SP GR UR STRIP.AUTO: 1.01
WBC #/AREA URNS HPF: ABNORMAL /HPF

## 2024-12-31 PROCEDURE — 93010 ELECTROCARDIOGRAM REPORT: CPT | Performed by: INTERNAL MEDICINE

## 2024-12-31 PROCEDURE — 81001 URINALYSIS AUTO W/SCOPE: CPT

## 2024-12-31 PROCEDURE — 93005 ELECTROCARDIOGRAM TRACING: CPT | Mod: TC

## 2025-01-02 ENCOUNTER — OFFICE VISIT (OUTPATIENT)
Dept: MEDICAL GROUP | Age: 70
End: 2025-01-02
Payer: MEDICARE

## 2025-01-02 VITALS
HEART RATE: 66 BPM | DIASTOLIC BLOOD PRESSURE: 92 MMHG | TEMPERATURE: 99.2 F | HEIGHT: 68 IN | BODY MASS INDEX: 35.61 KG/M2 | OXYGEN SATURATION: 92 % | SYSTOLIC BLOOD PRESSURE: 152 MMHG | WEIGHT: 235 LBS

## 2025-01-02 DIAGNOSIS — I10 ESSENTIAL HYPERTENSION, BENIGN: ICD-10-CM

## 2025-01-02 DIAGNOSIS — N13.5 URETERAL STRICTURE: ICD-10-CM

## 2025-01-02 PROCEDURE — 99214 OFFICE O/P EST MOD 30 MIN: CPT | Performed by: PHYSICIAN ASSISTANT

## 2025-01-02 PROCEDURE — 3077F SYST BP >= 140 MM HG: CPT | Performed by: PHYSICIAN ASSISTANT

## 2025-01-02 PROCEDURE — 3080F DIAST BP >= 90 MM HG: CPT | Performed by: PHYSICIAN ASSISTANT

## 2025-01-02 RX ORDER — HYDROCHLOROTHIAZIDE 25 MG/1
25 TABLET ORAL DAILY
Qty: 90 TABLET | Refills: 2 | Status: SHIPPED | OUTPATIENT
Start: 2025-01-02 | End: 2025-01-28 | Stop reason: SDUPTHER

## 2025-01-02 ASSESSMENT — PATIENT HEALTH QUESTIONNAIRE - PHQ9: CLINICAL INTERPRETATION OF PHQ2 SCORE: 0

## 2025-01-02 ASSESSMENT — FIBROSIS 4 INDEX: FIB4 SCORE: 1.67

## 2025-01-02 NOTE — PROGRESS NOTES
cc: Hypertension  Subjective:     HPI    History of Present Illness  The patient is a 69-year-old male presenting for a follow-up on hypertension.    At the last visit, his metoprolol dosage was increased to 200 mg. However, he did not tolerate the increase as his heart rates were between 50 and 60, and he experienced intermittent lightheadedness, fatigue, and diarrhea. He also does not tolerate amlodipine, so metoprolol was decreased to 150 mg, and 25 mg of hydrochlorothiazide was added on in addition to 40 mg of lisinopril. He reports that the addition of hydrochlorothiazide has been beneficial, with no adverse effects noted. The symptoms of lightheadedness, dizziness, and diarrhea have resolved following the reduction of metoprolol to 150 mg. He has been monitoring his blood pressure at home, recording approximately 8 or 9 readings since starting hydrochlorothiazide. The initial 4 readings remained consistent with previous measurements, averaging in the 150s and 160s. However, the most recent 2 or 3 readings have shown a downward trend, averaging in the 130s and 140s. He notes that his blood pressure today was elevated, which he attributes to an unusually high level of knee pain and the physical exertion required to attend this appointment. He has been on hydrochlorothiazide for approximately 2 weeks.      Supplemental Information  He has a cystoscopy procedure scheduled for next week.            Review of systems:  See above.       Current Outpatient Medications:     hydroCHLOROthiazide 25 MG Tab, Take 1 Tablet by mouth every day., Disp: 90 Tablet, Rfl: 2    cholestyramine light (PREVALITE) 4 GM/DOSE powder, , Disp: , Rfl:     metoprolol SR (TOPROL XL) 100 MG TABLET SR 24 HR, Take 1.5 Tablets by mouth every day., Disp: 135 Tablet, Rfl: 3    liothyronine (CYTOMEL) 5 MCG Tab, TAKE 1 TABLET BY MOUTH EVERY DAY, Disp: 90 Tablet, Rfl: 0    allopurinol (ZYLOPRIM) 100 MG Tab, Take 2 Tablets by mouth every day. (Patient  "taking differently: Take 200 mg by mouth every morning.), Disp: 180 Tablet, Rfl: 3    levothyroxine (SYNTHROID) 200 MCG Tab, Take 1 Tablet by mouth every morning on an empty stomach., Disp: 90 Tablet, Rfl: 3    cholestyramine (QUESTRAN) 4 g packet, Take 4 g by mouth 2 times a day., Disp: 1 Each, Rfl: 1    ASCORBIC ACID PO, Take  by mouth every day., Disp: , Rfl:     calcium citrate 315 mg-vitamin D 5 mcg 315-5 MG-MCG per tablet, Take 1,200 mg by mouth every day., Disp: , Rfl:     MULTIPLE VITAMIN PO, Take 1 Tablet by mouth every day., Disp: , Rfl:     lisinopril (PRINIVIL) 40 MG tablet, Take 1 Tablet by mouth every morning., Disp: , Rfl:     Omega-3 Fatty Acids (FISH OIL) 1000 MG Cap capsule, Take 1,000 mg by mouth every day., Disp: , Rfl:     Nutritional Supplements (NUTRITIONAL SUPPLEMENT PO), Take  by mouth. Heal n Soothe, Disp: , Rfl:     Allergies, past medical history, past surgical history, family history, social history reviewed and updated    Objective:     Vitals: BP (!) 152/92   Pulse 66   Temp 37.3 °C (99.2 °F) (Temporal)   Ht 1.727 m (5' 8\")   Wt 107 kg (235 lb)   SpO2 92%   BMI 35.73 kg/m²   General: Alert, pleasant, NAD  HEENT: Normocephalic. Neck supple. No carotid bruits   Heart: Regular rate and rhythm.  S1 and S2 normal.  No murmurs appreciated.  Respiratory: Normal respiratory effort.  Clear to auscultation bilaterally.  Skin: Warm, dry, no rashes.  Psych:  Affect/mood is normal, judgement is good, memory is intact, grooming is appropriate.    Assessment/Plan:     Luis was seen today for hypertension follow-up.    Diagnoses and all orders for this visit:    Essential hypertension, benign  -His last few readings at home have been between 130-140 systolic.  In a moderate amount of pain today with his knees blood pressure did improve on second reading although still elevated.  Will continue on 25 mg of hydrochlorothiazide.  Advised if after 1 more week blood pressures are still averaging above " 140 systolic then increase hydrochlorothiazide to 50 mg.  Continue 150 mg of metoprolol in addition to 40 mg of lisinopril.  In the event blood pressure is still elevated will have patient follow-up with cardiology  -     hydroCHLOROthiazide 25 MG Tab; Take 1 Tablet by mouth every day.  -     REFERRAL TO CARDIOLOGY    Ureteral stricture  Scheduled for cystoscopy with dilation 1/8      Return in about 3 weeks (around 1/23/2025) for HTN.

## 2025-01-07 ENCOUNTER — TELEPHONE (OUTPATIENT)
Dept: UROLOGY | Facility: MEDICAL CENTER | Age: 70
End: 2025-01-07
Payer: MEDICARE

## 2025-01-08 ENCOUNTER — HOSPITAL ENCOUNTER (OUTPATIENT)
Facility: MEDICAL CENTER | Age: 70
End: 2025-01-08
Attending: UROLOGY | Admitting: UROLOGY
Payer: MEDICARE

## 2025-01-08 ENCOUNTER — ANESTHESIA EVENT (OUTPATIENT)
Dept: SURGERY | Facility: MEDICAL CENTER | Age: 70
End: 2025-01-08
Payer: MEDICARE

## 2025-01-08 ENCOUNTER — ANESTHESIA (OUTPATIENT)
Dept: SURGERY | Facility: MEDICAL CENTER | Age: 70
End: 2025-01-08
Payer: MEDICARE

## 2025-01-08 VITALS
RESPIRATION RATE: 19 BRPM | WEIGHT: 234.13 LBS | DIASTOLIC BLOOD PRESSURE: 99 MMHG | HEART RATE: 65 BPM | OXYGEN SATURATION: 96 % | TEMPERATURE: 96.6 F | SYSTOLIC BLOOD PRESSURE: 177 MMHG | HEIGHT: 70 IN | BODY MASS INDEX: 33.52 KG/M2

## 2025-01-08 DIAGNOSIS — E03.9 ACQUIRED HYPOTHYROIDISM: ICD-10-CM

## 2025-01-08 PROCEDURE — 160028 HCHG SURGERY MINUTES - 1ST 30 MINS LEVEL 3: Performed by: UROLOGY

## 2025-01-08 PROCEDURE — 160048 HCHG OR STATISTICAL LEVEL 1-5: Performed by: UROLOGY

## 2025-01-08 PROCEDURE — 160035 HCHG PACU - 1ST 60 MINS PHASE I: Performed by: UROLOGY

## 2025-01-08 PROCEDURE — 160002 HCHG RECOVERY MINUTES (STAT): Performed by: UROLOGY

## 2025-01-08 PROCEDURE — 700102 HCHG RX REV CODE 250 W/ 637 OVERRIDE(OP): Performed by: ANESTHESIOLOGY

## 2025-01-08 PROCEDURE — A9270 NON-COVERED ITEM OR SERVICE: HCPCS | Performed by: ANESTHESIOLOGY

## 2025-01-08 PROCEDURE — 88342 IMHCHEM/IMCYTCHM 1ST ANTB: CPT | Performed by: PATHOLOGY

## 2025-01-08 PROCEDURE — 160036 HCHG PACU - EA ADDL 30 MINS PHASE I: Performed by: UROLOGY

## 2025-01-08 PROCEDURE — 160039 HCHG SURGERY MINUTES - EA ADDL 1 MIN LEVEL 3: Performed by: UROLOGY

## 2025-01-08 PROCEDURE — 700105 HCHG RX REV CODE 258: Performed by: UROLOGY

## 2025-01-08 PROCEDURE — 160047 HCHG PACU  - EA ADDL 30 MINS PHASE II: Performed by: UROLOGY

## 2025-01-08 PROCEDURE — 700111 HCHG RX REV CODE 636 W/ 250 OVERRIDE (IP): Performed by: ANESTHESIOLOGY

## 2025-01-08 PROCEDURE — 700111 HCHG RX REV CODE 636 W/ 250 OVERRIDE (IP): Mod: JZ | Performed by: ANESTHESIOLOGY

## 2025-01-08 PROCEDURE — 88341 IMHCHEM/IMCYTCHM EA ADD ANTB: CPT | Mod: 26 | Performed by: PATHOLOGY

## 2025-01-08 PROCEDURE — 700101 HCHG RX REV CODE 250: Performed by: ANESTHESIOLOGY

## 2025-01-08 PROCEDURE — 88305 TISSUE EXAM BY PATHOLOGIST: CPT | Performed by: PATHOLOGY

## 2025-01-08 PROCEDURE — 160046 HCHG PACU - 1ST 60 MINS PHASE II: Performed by: UROLOGY

## 2025-01-08 PROCEDURE — 88305 TISSUE EXAM BY PATHOLOGIST: CPT | Mod: 26 | Performed by: PATHOLOGY

## 2025-01-08 PROCEDURE — 110371 HCHG SHELL REV 272: Performed by: UROLOGY

## 2025-01-08 PROCEDURE — 88341 IMHCHEM/IMCYTCHM EA ADD ANTB: CPT | Performed by: PATHOLOGY

## 2025-01-08 PROCEDURE — 52276 CYSTOSCOPY AND TREATMENT: CPT | Performed by: UROLOGY

## 2025-01-08 PROCEDURE — 160009 HCHG ANES TIME/MIN: Performed by: UROLOGY

## 2025-01-08 PROCEDURE — 88342 IMHCHEM/IMCYTCHM 1ST ANTB: CPT | Mod: 26 | Performed by: PATHOLOGY

## 2025-01-08 PROCEDURE — 160025 RECOVERY II MINUTES (STATS): Performed by: UROLOGY

## 2025-01-08 PROCEDURE — 52204 CYSTOSCOPY W/BIOPSY(S): CPT | Mod: 59 | Performed by: UROLOGY

## 2025-01-08 RX ORDER — DIPHENHYDRAMINE HYDROCHLORIDE 50 MG/ML
12.5 INJECTION INTRAMUSCULAR; INTRAVENOUS
Status: DISCONTINUED | OUTPATIENT
Start: 2025-01-08 | End: 2025-01-08 | Stop reason: HOSPADM

## 2025-01-08 RX ORDER — SODIUM CHLORIDE, SODIUM LACTATE, POTASSIUM CHLORIDE, CALCIUM CHLORIDE 600; 310; 30; 20 MG/100ML; MG/100ML; MG/100ML; MG/100ML
INJECTION, SOLUTION INTRAVENOUS CONTINUOUS
Status: ACTIVE | OUTPATIENT
Start: 2025-01-08 | End: 2025-01-08

## 2025-01-08 RX ORDER — EPHEDRINE SULFATE 50 MG/ML
INJECTION, SOLUTION INTRAVENOUS PRN
Status: DISCONTINUED | OUTPATIENT
Start: 2025-01-08 | End: 2025-01-08 | Stop reason: SURG

## 2025-01-08 RX ORDER — DEXAMETHASONE SODIUM PHOSPHATE 4 MG/ML
INJECTION, SOLUTION INTRA-ARTICULAR; INTRALESIONAL; INTRAMUSCULAR; INTRAVENOUS; SOFT TISSUE PRN
Status: DISCONTINUED | OUTPATIENT
Start: 2025-01-08 | End: 2025-01-08 | Stop reason: SURG

## 2025-01-08 RX ORDER — SODIUM CHLORIDE, SODIUM LACTATE, POTASSIUM CHLORIDE, CALCIUM CHLORIDE 600; 310; 30; 20 MG/100ML; MG/100ML; MG/100ML; MG/100ML
INJECTION, SOLUTION INTRAVENOUS CONTINUOUS
Status: DISCONTINUED | OUTPATIENT
Start: 2025-01-08 | End: 2025-01-08 | Stop reason: HOSPADM

## 2025-01-08 RX ORDER — ACETAMINOPHEN 500 MG
1000 TABLET ORAL ONCE
Status: COMPLETED | OUTPATIENT
Start: 2025-01-08 | End: 2025-01-08

## 2025-01-08 RX ORDER — OXYCODONE HCL 5 MG/5 ML
10 SOLUTION, ORAL ORAL
Status: DISCONTINUED | OUTPATIENT
Start: 2025-01-08 | End: 2025-01-08 | Stop reason: HOSPADM

## 2025-01-08 RX ORDER — CEFAZOLIN SODIUM 1 G/3ML
INJECTION, POWDER, FOR SOLUTION INTRAMUSCULAR; INTRAVENOUS PRN
Status: DISCONTINUED | OUTPATIENT
Start: 2025-01-08 | End: 2025-01-08 | Stop reason: SURG

## 2025-01-08 RX ORDER — HALOPERIDOL 5 MG/ML
1 INJECTION INTRAMUSCULAR
Status: DISCONTINUED | OUTPATIENT
Start: 2025-01-08 | End: 2025-01-08 | Stop reason: HOSPADM

## 2025-01-08 RX ORDER — LIDOCAINE HYDROCHLORIDE 20 MG/ML
INJECTION, SOLUTION EPIDURAL; INFILTRATION; INTRACAUDAL; PERINEURAL PRN
Status: DISCONTINUED | OUTPATIENT
Start: 2025-01-08 | End: 2025-01-08 | Stop reason: SURG

## 2025-01-08 RX ORDER — HYDROMORPHONE HYDROCHLORIDE 2 MG/ML
INJECTION, SOLUTION INTRAMUSCULAR; INTRAVENOUS; SUBCUTANEOUS PRN
Status: DISCONTINUED | OUTPATIENT
Start: 2025-01-08 | End: 2025-01-08 | Stop reason: SURG

## 2025-01-08 RX ORDER — OXYCODONE HCL 5 MG/5 ML
5 SOLUTION, ORAL ORAL
Status: DISCONTINUED | OUTPATIENT
Start: 2025-01-08 | End: 2025-01-08 | Stop reason: HOSPADM

## 2025-01-08 RX ORDER — HYDROMORPHONE HYDROCHLORIDE 1 MG/ML
0.4 INJECTION, SOLUTION INTRAMUSCULAR; INTRAVENOUS; SUBCUTANEOUS
Status: DISCONTINUED | OUTPATIENT
Start: 2025-01-08 | End: 2025-01-08 | Stop reason: HOSPADM

## 2025-01-08 RX ORDER — ONDANSETRON 2 MG/ML
4 INJECTION INTRAMUSCULAR; INTRAVENOUS
Status: DISCONTINUED | OUTPATIENT
Start: 2025-01-08 | End: 2025-01-08 | Stop reason: HOSPADM

## 2025-01-08 RX ORDER — HYDRALAZINE HYDROCHLORIDE 20 MG/ML
5 INJECTION INTRAMUSCULAR; INTRAVENOUS
Status: DISCONTINUED | OUTPATIENT
Start: 2025-01-08 | End: 2025-01-08 | Stop reason: HOSPADM

## 2025-01-08 RX ORDER — IPRATROPIUM BROMIDE AND ALBUTEROL SULFATE 2.5; .5 MG/3ML; MG/3ML
3 SOLUTION RESPIRATORY (INHALATION)
Status: DISCONTINUED | OUTPATIENT
Start: 2025-01-08 | End: 2025-01-08 | Stop reason: HOSPADM

## 2025-01-08 RX ORDER — METOPROLOL TARTRATE 1 MG/ML
1 INJECTION, SOLUTION INTRAVENOUS
Status: DISCONTINUED | OUTPATIENT
Start: 2025-01-08 | End: 2025-01-08 | Stop reason: HOSPADM

## 2025-01-08 RX ORDER — HYDROMORPHONE HYDROCHLORIDE 1 MG/ML
0.1 INJECTION, SOLUTION INTRAMUSCULAR; INTRAVENOUS; SUBCUTANEOUS
Status: DISCONTINUED | OUTPATIENT
Start: 2025-01-08 | End: 2025-01-08 | Stop reason: HOSPADM

## 2025-01-08 RX ORDER — HYDROMORPHONE HYDROCHLORIDE 1 MG/ML
0.2 INJECTION, SOLUTION INTRAMUSCULAR; INTRAVENOUS; SUBCUTANEOUS
Status: DISCONTINUED | OUTPATIENT
Start: 2025-01-08 | End: 2025-01-08 | Stop reason: HOSPADM

## 2025-01-08 RX ORDER — ONDANSETRON 2 MG/ML
INJECTION INTRAMUSCULAR; INTRAVENOUS PRN
Status: DISCONTINUED | OUTPATIENT
Start: 2025-01-08 | End: 2025-01-08 | Stop reason: SURG

## 2025-01-08 RX ADMIN — PROPOFOL 200 MG: 10 INJECTION, EMULSION INTRAVENOUS at 13:46

## 2025-01-08 RX ADMIN — CEFAZOLIN 2 G: 1 INJECTION, POWDER, FOR SOLUTION INTRAMUSCULAR; INTRAVENOUS at 13:46

## 2025-01-08 RX ADMIN — ONDANSETRON 4 MG: 2 INJECTION INTRAMUSCULAR; INTRAVENOUS at 14:11

## 2025-01-08 RX ADMIN — HYDROMORPHONE HYDROCHLORIDE 1 MG: 2 INJECTION INTRAMUSCULAR; INTRAVENOUS; SUBCUTANEOUS at 13:38

## 2025-01-08 RX ADMIN — HYDROMORPHONE HYDROCHLORIDE 1 MG: 2 INJECTION INTRAMUSCULAR; INTRAVENOUS; SUBCUTANEOUS at 14:10

## 2025-01-08 RX ADMIN — ACETAMINOPHEN 1000 MG: 500 TABLET ORAL at 12:20

## 2025-01-08 RX ADMIN — DEXAMETHASONE SODIUM PHOSPHATE 8 MG: 4 INJECTION INTRA-ARTICULAR; INTRALESIONAL; INTRAMUSCULAR; INTRAVENOUS; SOFT TISSUE at 13:46

## 2025-01-08 RX ADMIN — HYDRALAZINE HYDROCHLORIDE 5 MG: 20 INJECTION INTRAMUSCULAR; INTRAVENOUS at 15:36

## 2025-01-08 RX ADMIN — HYDRALAZINE HYDROCHLORIDE 5 MG: 20 INJECTION INTRAMUSCULAR; INTRAVENOUS at 15:08

## 2025-01-08 RX ADMIN — LIDOCAINE HYDROCHLORIDE 100 MG: 20 INJECTION, SOLUTION EPIDURAL; INFILTRATION; INTRACAUDAL; PERINEURAL at 13:46

## 2025-01-08 RX ADMIN — EPHEDRINE SULFATE 20 MG: 50 INJECTION, SOLUTION INTRAVENOUS at 14:03

## 2025-01-08 RX ADMIN — SODIUM CHLORIDE, POTASSIUM CHLORIDE, SODIUM LACTATE AND CALCIUM CHLORIDE: 600; 310; 30; 20 INJECTION, SOLUTION INTRAVENOUS at 13:38

## 2025-01-08 RX ADMIN — PROPOFOL 50 MG: 10 INJECTION, EMULSION INTRAVENOUS at 14:10

## 2025-01-08 RX ADMIN — PROPOFOL 50 MG: 10 INJECTION, EMULSION INTRAVENOUS at 13:51

## 2025-01-08 RX ADMIN — HYDRALAZINE HYDROCHLORIDE 5 MG: 20 INJECTION INTRAMUSCULAR; INTRAVENOUS at 15:00

## 2025-01-08 ASSESSMENT — PAIN DESCRIPTION - PAIN TYPE
TYPE: SURGICAL PAIN
TYPE: CHRONIC PAIN
TYPE: SURGICAL PAIN

## 2025-01-08 ASSESSMENT — FIBROSIS 4 INDEX: FIB4 SCORE: 1.67

## 2025-01-08 ASSESSMENT — PAIN SCALES - GENERAL: PAIN_LEVEL: 0

## 2025-01-08 NOTE — DISCHARGE INSTR - OTHER INFO
Postoperative Instructions:  -Be sure to stay very well hydrated; expect to see some blood in the urine, but drink enough to flush it out of the bladder.  -Avoid any heavy lifting or straining for two weeks.  -Follow up as scheduled for catheter removal on Monday 1/13/25.  -If you have any questions or concerns prior to the appointment, call our office at [phone].

## 2025-01-08 NOTE — ANESTHESIA PROCEDURE NOTES
Airway    Date/Time: 1/8/2025 1:47 PM    Performed by: Britton Ledezma M.D.  Authorized by: Britton Ledezma M.D.    Location:  OR  Urgency:  Elective  Indications for Airway Management:  Anesthesia      Spontaneous Ventilation: absent    Sedation Level:  Deep  Preoxygenated: Yes    Final Airway Type:  Supraglottic airway  Final Supraglottic Airway:  Standard LMA    SGA Size:  4  Number of Attempts at Approach:  1

## 2025-01-08 NOTE — OR SURGEON
Immediate Post OP Note    PreOp Diagnosis: Membranous urethral stenosis      PostOp Diagnosis: Membranous urethral stenosis, pendulous urethral masses      Procedure(s):  CYSTOSCOPY, DIRECT VISION INTERNAL URETHROTOMY, DRUG COATED BALLOON DILATION, URETHRAL BIOPSIES - Wound Class: Clean Contaminated  URETHROTOMY, INTERNAL - Wound Class: Clean Contaminated    Surgeon(s):  Castro Burleson M.D.    Anesthesiologist/Type of Anesthesia:  Anesthesiologist: Britton Ledezma M.D./General    Surgical Staff:  Circulator: Rc Benedict R.N.  Scrub Person: Melecio Gavin    Specimens removed if any:  ID Type Source Tests Collected by Time Destination   A : Urethral polyps Other Other PATHOLOGY SPECIMEN Castro Burleson M.D. 1/8/2025  2:27 PM        Estimated Blood Loss: 5    Findings: Two papillary masses in pendulous urethra; tight membranous urethral stenosis    Complications: None        1/8/2025 2:41 PM Castro Burleson M.D.

## 2025-01-08 NOTE — DISCHARGE INSTRUCTIONS
What to Expect Post Anesthesia    Rest and take it easy for the first 24 hours.  A responsible adult is recommended to remain with you during that time.  It is normal to feel sleepy.  We encourage you to not do anything that requires balance, judgment or coordination.    FOR 24 HOURS DO NOT:  Drive, operate machinery or run household appliances.  Drink beer or alcoholic beverages.  Make important decisions or sign legal documents.    To avoid nausea, slowly advance diet as tolerated, avoiding spicy or greasy foods for the first day.  Add more substantial food to your diet according to your provider's instructions.  Babies can be fed formula or breast milk as soon as they are hungry.  INCREASE FLUIDS AND FIBER TO AVOID CONSTIPATION.    MILD FLU-LIKE SYMPTOMS ARE NORMAL.  YOU MAY EXPERIENCE GENERALIZED MUSCLE ACHES, THROAT IRRITATION, HEADACHE AND/OR SOME NAUSEA.    If any questions arise, call your provider.  If your provider is not available, please feel free to call the [facility] at [phone].    MEDICATIONS: Resume taking daily medication.  Take prescribed pain medication with food.  If no medication is prescribed, you may take non-aspirin pain medication if needed.  PAIN MEDICATION CAN BE VERY CONSTIPATING.  Take a stool softener or laxative such as senokot, pericolace, or milk of magnesia if needed.    Last pain medication given at: N/A    Instructions from Dr. Burleson:  -Be sure to stay very well hydrated; expect to see some blood in the urine, but drink enough to flush it out of the bladder.  -Avoid any heavy lifting or straining for two weeks.  -Follow up as scheduled for catheter removal on Monday 1/13/25.  -If you have any questions or concerns prior to the appointment, call our office at [phone].Indwelling Urinary Catheter Insertion, Care After  This sheet gives you information about how to care for yourself after your procedure. Your health care provider may also give you more specific  instructions. If you have problems or questions, contact your health care provider.  What can I expect after the procedure?  After the procedure, it is common to have:  Slight discomfort around your urethra where the catheter enters your body.  Follow these instructions at home:  General instructions  Keep the drainage bag at or below the level of your bladder. By doing this, your urine can only drain out instead of going back into your body.  Secure the catheter tubing and drainage bag to your leg or thigh to keep it from moving.  Check the catheter tubing regularly to make sure there are no kinks or blockages.  Take showers daily to keep the catheter clean. Do not take a bath.  Do not pull on your catheter.  Disconnect the tubing and drainage bag as little as possible.  Empty the drainage bag every 2-4 hours, or more often if needed. Do not let the bag get completely full.  Wash your hands with soap and water before and after touching the catheter, tubing, or drainage bag.  Do not let the drainage bag or catheter tubing touch the floor.  Drink enough fluids to keep your urine pale yellow, or as told by your health care provider.    Safety  Let your health care provider know if:  Your bladder is full, but you are not able to urinate.  You have removed the catheter, but you are not able to urinate after 8 hours.  Contact a health care provider if:  Your urine:  Looks cloudy.  Has a bad smell.  Stops flowing into the drainage bag.  Your catheter:  Gets clogged.  Starts to leak.  You feel pain or pressure in the bladder area.  You have back pain.  Your drainage bag or tubing looks dirty.  Get help right away if:  You have a fever or chills.  You have severe pain in your back or your lower abdomen.  You have warmth, redness, swelling, or pain in the urethra area.  You notice blood in your urine.  Your catheter gets pulled out.  Summary  Wash your hands with soap and water before and after touching the catheter, tubing,  or drainage bag.  Do not pull on your catheter or try to remove it.  Keep the drainage bag at or below the level of your bladder, but do not let the drainage bag or catheter tubing touch the floor.  Get help right away if you have a fever, chills, or any other signs of infection.  This information is not intended to replace advice given to you by your health care provider. Make sure you discuss any questions you have with your health care provider.  Document Revised: 03/09/2022 Document Reviewed: 12/03/2021  Elsevier Patient Education © 2022 [employer]
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
## 2025-01-08 NOTE — ANESTHESIA TIME REPORT
Anesthesia Start and Stop Event Times       Date Time Event    1/8/2025 1335 Ready for Procedure     1338 Anesthesia Start     1451 Anesthesia Stop          Responsible Staff  01/08/25      Name Role Begin End    Britton Ledezma M.D. Anesth 1338 1458          Overtime Reason:  no overtime (within assigned shift)    Comments:

## 2025-01-08 NOTE — OR NURSING
1443: Patient arrived to PACU [place]. Report received from anesthesia and RN. Respirations are spontaneous and unlabored. VSS on 6L.    1500: Medicated for HTN.    1508: Medicated for HTN.    1536: Medicated for HTN. Family updated via phone.     1610: Patient more awake, denies surgical pain.    1620: Patient tolerating sips of water.    1640: 475 ml urine drained.    1645: Recovery completed at this time. Patient meets criteria for transfer to stage 2. Report given to RN.    1647: RN at bedside to transfer patient to stage 2.

## 2025-01-08 NOTE — OP REPORT
Operative Report    Patient: Moise Cagle    MRN: [id number]    Operation: Cystoscopy, urethral biopsies, direct visual internal urethrotomy, drug-coated balloon dilation of membranous urethral stenosis    Anesthesia: General    EBL: 5 mL    Surgeon: Castro Burleson MD    Assistant(s): n/a    Preoperative diagnosis: Membranous urethral stenosis    Postoperative diagnosis: Membranous urethral stenosis, pendulous urethral masses    Specimen: Urethral biopsies    Operative indications: Moise Cagle is a 69 y.o. male with a history of obstructive urinary symptoms found to have a tight membranous urethral stenosis on office cystoscopy, likely related to his history of radiation therapy for prostate cancer.     Operative details: The patient was brought to the operating room and placed on the operating table in supine position. After administration of anesthesia, he was placed in the dorsal lithotomy position with all pressure points adequately padded, and then prepped and draped in usual sterile fashion. A surgical time-out was performed to confirm patient identity, diagnosis, operative plan including laterality, availability of all required equipment, and administration of proper antibiotic prophylaxis.     After the time-out, the case began by inserting a 22 Irish rigid cystoscope and carefully inspecting the urethra. Inspection revealed a normal meatus and fossa navicularis, two papillary lesions of about 5-6 mm diameter each in the pendulous urethra, and a normal bulbous urethra. There was a very tight stenosis near the bulbomembranous junction with a residual lumen of about 4-5 Fr.     Next, given the unexpected finding of the urethral lesions, a rigid cold cup forcep was used with the cystoscope sheath to biopsy (and fully excise) the two urethral lesions. One of these papillary masses broke and required a second biopsy at the base to remove it, but once this was done no urethral lesions  remained. These biopsies were sent for permanent pathology.    The urethrotome was now inserted.  A Sensor wire was advanced through the membranous urethra and up to the bladder. A cold knife was then used to perform the internal urethrotomy at the 12 o'clock position. As the stenosis opened, it revealed a total of about a 2 cm stricture extending through the length of the membranous urethra. Once the internal urethrotomy was completed, the scope was able to pass into the prostatic urethra and bladder. The urethrotome was removed, and the regular 22 Fr cystoscope inserted. The prostatic urethra was normal without any obstructive hypertrophy; the bladder neck is also patent. 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    The cystoscope was then retracted to just distal to the membranous urethra and the site of the urethrotomy. A 30 Fr x 50 mm Optilume drug coated balloon was then advanced over the Sensor wire until the balloon spanned the area of the stricture. The balloon was fully inflated, and the cystoscope removed. The balloon was left inflated for a total of 10 minutes, after which it was deflated and removed. Finally, a 20 Fr Councill catheter was advanced over the wire and into the bladder. The balloon was filled with 10 cc of sterile water.     The patient was cleaned and dried and placed back into the supine position, and then woken from anesthesia.  The patient was then transferred to the recovery room in stable position.      Castro Burleson MD

## 2025-01-08 NOTE — OR NURSING
1105 PT TO PRE OP TO ASSUME CARE    1248 Patient allergies and NPO status verified, home medication reconciliation completed and belongings secured. Patient verbalizes understanding of pain scale, expected course of stay and plan of care. Surgical site verified with patient. IV access established.

## 2025-01-09 LAB — PATHOLOGY CONSULT NOTE: NORMAL

## 2025-01-09 RX ORDER — LIOTHYRONINE SODIUM 5 UG/1
5 TABLET ORAL
Qty: 100 TABLET | Refills: 3 | Status: SHIPPED | OUTPATIENT
Start: 2025-01-09

## 2025-01-09 NOTE — ANESTHESIA POSTPROCEDURE EVALUATION
Patient: Moise Cagle    Procedure Summary       Date: 01/08/25 Room / Location:  OR  / SURGERY [facility]    Anesthesia Start: 1338 Anesthesia Stop: 4171    Procedures:       CYSTOSCOPY, DIRECT VISION INTERNAL URETHROTOMY, DRUG COATED BALLOON DILATION, URETHRAL BIOPSIES (Bladder)      URETHROTOMY, INTERNAL (Urethra) Diagnosis: (MEMBRANES URETHRAL STRUCTURE, URETHRAL LESIONS)    Surgeons: Castro Burleson M.D. Responsible Provider: Britton Ledezma M.D.    Anesthesia Type: general ASA Status: 3            Final Anesthesia Type: general  Last vitals  BP   Blood Pressure : (!) 167/80    Temp   36.3 °C (97.3 °F)    Pulse   65   Resp   14    SpO2   94 %      Anesthesia Post Evaluation    Patient location during evaluation: PACU  Patient participation: complete - patient participated  Level of consciousness: sleepy but conscious  Pain score: 0    Airway patency: patent  Anesthetic complications: no  Cardiovascular status: hypertensive and hemodynamically stable  Respiratory status: acceptable  Hydration status: balanced    PONV: none          There were no known notable events for this encounter.     Nurse Pain Score: 0 (NPRS)
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
## 2025-01-13 ENCOUNTER — OFFICE VISIT (OUTPATIENT)
Dept: UROLOGY | Facility: MEDICAL CENTER | Age: 70
End: 2025-01-13
Payer: MEDICARE

## 2025-01-13 DIAGNOSIS — N99.112 POSTPROCEDURAL MEMBRANOUS URETHRAL STRICTURE: ICD-10-CM

## 2025-01-13 LAB
POC POST-VOID: 28 ML
POC PRE-VOID: NORMAL

## 2025-01-13 PROCEDURE — 51798 US URINE CAPACITY MEASURE: CPT | Performed by: UROLOGY

## 2025-01-13 PROCEDURE — 99024 POSTOP FOLLOW-UP VISIT: CPT | Performed by: UROLOGY
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
## 2025-01-14 ENCOUNTER — TELEPHONE (OUTPATIENT)
Dept: UROLOGY | Facility: MEDICAL CENTER | Age: 70
End: 2025-01-14
Payer: MEDICARE

## 2025-01-15 NOTE — TELEPHONE ENCOUNTER
I called Mr. Cagle today after Dr. Coelho from pathology reached out about his urethral biopsies. One of the two lesions appears to be HPV related, but the other (smaller) lesion had multiple malignant appearing cells without a clear diagnosis as of yet. She is sending the sample to [place] for further evaluation.   We will plan on cystoscopy at his next visit for surveillance of the urethra, and may plan further studies based on the upcoming report from [place].

## 2025-01-16 ENCOUNTER — TELEPHONE (OUTPATIENT)
Dept: HEALTH INFORMATION MANAGEMENT | Facility: OTHER | Age: 70
End: 2025-01-16
Payer: MEDICARE

## 2025-01-16 DIAGNOSIS — I10 ESSENTIAL HYPERTENSION, BENIGN: ICD-10-CM

## 2025-01-16 RX ORDER — LISINOPRIL 40 MG/1
40 TABLET ORAL EVERY MORNING
Qty: 100 TABLET | Refills: 3 | Status: SHIPPED | OUTPATIENT
Start: 2025-01-16

## 2025-01-28 ENCOUNTER — OFFICE VISIT (OUTPATIENT)
Dept: MEDICAL GROUP | Age: 70
End: 2025-01-28
Payer: MEDICARE

## 2025-01-28 VITALS
DIASTOLIC BLOOD PRESSURE: 82 MMHG | HEART RATE: 61 BPM | WEIGHT: 237 LBS | BODY MASS INDEX: 33.93 KG/M2 | SYSTOLIC BLOOD PRESSURE: 160 MMHG | OXYGEN SATURATION: 97 % | TEMPERATURE: 98.6 F | HEIGHT: 70 IN

## 2025-01-28 DIAGNOSIS — N13.5 URETERAL STRICTURE: ICD-10-CM

## 2025-01-28 DIAGNOSIS — I10 ESSENTIAL HYPERTENSION, BENIGN: ICD-10-CM

## 2025-01-28 PROCEDURE — 3079F DIAST BP 80-89 MM HG: CPT | Performed by: PHYSICIAN ASSISTANT

## 2025-01-28 PROCEDURE — 3077F SYST BP >= 140 MM HG: CPT | Performed by: PHYSICIAN ASSISTANT

## 2025-01-28 PROCEDURE — 99214 OFFICE O/P EST MOD 30 MIN: CPT | Performed by: PHYSICIAN ASSISTANT

## 2025-01-28 RX ORDER — CLONIDINE HYDROCHLORIDE 0.1 MG/1
0.1 TABLET ORAL 2 TIMES DAILY
Qty: 60 TABLET | Refills: 1 | Status: SHIPPED | OUTPATIENT
Start: 2025-01-28

## 2025-01-28 RX ORDER — HYDROCHLOROTHIAZIDE 50 MG/1
50 TABLET ORAL DAILY
Qty: 100 TABLET | Refills: 3 | Status: SHIPPED | OUTPATIENT
Start: 2025-01-28

## 2025-01-28 ASSESSMENT — FIBROSIS 4 INDEX: FIB4 SCORE: 1.67

## 2025-01-28 NOTE — PROGRESS NOTES
cc: Follow-up hypertension    Subjective:     HPI    History of Present Illness  The patient is a 69-year-old male presenting for a follow-up on hypertension.    He is currently on 40 mg of lisinopril and 150 mg of metoprolol, as he does not tolerate higher dosages. He was also started on 25 mg of hydrochlorothiazide. During his recent procedure, his initial blood pressure reading was 210, which he attributes to anxiety. The procedure was almost cancelled due to this high reading. However, his blood pressure subsequently decreased to 178/99, allowing the procedure to proceed. Since the initiation of hydrochlorothiazide, his home readings have been in the mid-140s to 150s, with a peak of 160 today. He has been taking two tablets of hydrochlorothiazide 25 mg, resulting in a total daily dose of 50 mg. He received a call from cardiology to schedule an appointment but requested a delay due to the recent change in his medication dosage.  Denies chest pain, palpitations, shortness of breath, lower extremity edema, headaches.    He did have cystoscopy with balloon dilation 1/8.  He reports an improvement in his urinary symptoms, although he still experiences leakage when his bladder is full. He describes this as overflow leakage that he can not control. He is able to empty his bladder completely, but notes that his stream has not been normal for several years. Currently, his stream is consistent until his bladder is empty.  He has been sleeping longer between bathroom visits, now up to 2.5 hours compared to a maximum of 1.5 hours previously. His next appointment with the urologist is scheduled for 04/13/2025. He also mentions the discovery of two small polyps in his urethra, one of which was removed and tested normal. The second polyp was sent to [place] for further evaluation, and he is awaiting the results.    He has an upcoming appointment with a nephrologist tomorrow          Review of systems:  See above.  "      Current Outpatient Medications:     hydroCHLOROthiazide 50 MG Tab, Take 1 Tablet by mouth every day., Disp: 100 Tablet, Rfl: 3    cloNIDine (CATAPRES) 0.1 MG Tab, Take 1 Tablet by mouth 2 times a day., Disp: 60 Tablet, Rfl: 1    lisinopril (PRINIVIL) 40 MG tablet, Take 1 Tablet by mouth every morning., Disp: 100 Tablet, Rfl: 3    liothyronine (CYTOMEL) 5 MCG Tab, TAKE 1 TABLET BY MOUTH EVERY DAY, Disp: 100 Tablet, Rfl: 3    cholestyramine light (PREVALITE) 4 GM/DOSE powder, , Disp: , Rfl:     metoprolol SR (TOPROL XL) 100 MG TABLET SR 24 HR, Take 1.5 Tablets by mouth every day., Disp: 135 Tablet, Rfl: 3    levothyroxine (SYNTHROID) 200 MCG Tab, Take 1 Tablet by mouth every morning on an empty stomach., Disp: 90 Tablet, Rfl: 3    cholestyramine (QUESTRAN) 4 g packet, Take 4 g by mouth 2 times a day., Disp: 1 Each, Rfl: 1    ASCORBIC ACID PO, Take  by mouth every day., Disp: , Rfl:     calcium citrate 315 mg-vitamin D 5 mcg 315-5 MG-MCG per tablet, Take 1,200 mg by mouth every day., Disp: , Rfl:     MULTIPLE VITAMIN PO, Take 1 Tablet by mouth every day., Disp: , Rfl:     Omega-3 Fatty Acids (FISH OIL) 1000 MG Cap capsule, Take 1,000 mg by mouth every day., Disp: , Rfl:     Nutritional Supplements (NUTRITIONAL SUPPLEMENT PO), Take  by mouth. Heal n Soothe, Disp: , Rfl:     allopurinol (ZYLOPRIM) 100 MG Tab, Take 2 Tablets by mouth every day. (Patient not taking: Reported on 1/28/2025), Disp: 180 Tablet, Rfl: 3    Allergies, past medical history, past surgical history, family history, social history reviewed and updated    Objective:     Vitals: BP (!) 160/82 (BP Location: Left arm, Patient Position: Sitting, BP Cuff Size: Adult)   Pulse 61   Temp 37 °C (98.6 °F) (Temporal)   Ht 1.778 m (5' 10\")   Wt 108 kg (237 lb)   SpO2 97%   BMI 34.01 kg/m²   General: Alert, pleasant, NAD  HEENT: Normocephalic. Neck supple.   No carotid bruits   Heart: Regular rate and rhythm.  S1 and S2 normal.  No murmurs " appreciated.  Respiratory: Normal respiratory effort.  Clear to auscultation bilaterally.  Skin: Warm, dry, no rashes.  Psych:  Affect/mood is normal, judgement is good, memory is intact, grooming is appropriate.    Assessment/Plan:     Luis was seen today for hypertension follow-up.    Diagnoses and all orders for this visit:    Essential hypertension, benign  -Uncontrolled.  Does not tolerate higher dosages of metoprolol, does not tolerate amlodipine.  Will continue 150 mg of metoprolol, 40 mg lisinopril, 50 mg hydrochlorothiazide.  Add on 0.1 mg of clonidine.  Advised if after a week blood pressure still greater than 140 systolic increase clonidine to twice daily.  Will contact the clinic in about 2 weeks blood pressure still elevated will plan on increasing clonidine 0.2 mg.  He has been referred to cardiology.  Advised to call and schedule appointment as we are now on 4 blood pressure medications he should be evaluated by cardiology  -     hydroCHLOROthiazide 50 MG Tab; Take 1 Tablet by mouth every day.  -     cloNIDine (CATAPRES) 0.1 MG Tab; Take 1 Tablet by mouth 2 times a day.    Ureteral stricture  Did have cystoscopy with balloon dilation.  Noting good symptom improvement.   follow-up with urology as recommended      Return in about 4 weeks (around 2/25/2025) for HTN.

## 2025-01-29 ENCOUNTER — APPOINTMENT (OUTPATIENT)
Dept: NEPHROLOGY | Facility: MEDICAL CENTER | Age: 70
End: 2025-01-29
Attending: UROLOGY
Payer: MEDICARE

## 2025-01-29 VITALS
WEIGHT: 235 LBS | OXYGEN SATURATION: 97 % | BODY MASS INDEX: 33.64 KG/M2 | HEART RATE: 60 BPM | SYSTOLIC BLOOD PRESSURE: 112 MMHG | HEIGHT: 70 IN | TEMPERATURE: 99.1 F | DIASTOLIC BLOOD PRESSURE: 72 MMHG

## 2025-01-29 DIAGNOSIS — N13.9 OBSTRUCTIVE UROPATHY: ICD-10-CM

## 2025-01-29 DIAGNOSIS — N18.32 STAGE 3B CHRONIC KIDNEY DISEASE: ICD-10-CM

## 2025-01-29 DIAGNOSIS — Z85.46 HISTORY OF PROSTATE CANCER: ICD-10-CM

## 2025-01-29 DIAGNOSIS — I10 ESSENTIAL HYPERTENSION, BENIGN: ICD-10-CM

## 2025-01-29 DIAGNOSIS — N26.1 ATROPHIC KIDNEY: ICD-10-CM

## 2025-01-29 DIAGNOSIS — M17.2 POST-TRAUMATIC OSTEOARTHRITIS OF BOTH KNEES: ICD-10-CM

## 2025-01-29 DIAGNOSIS — N20.0 NEPHROLITHIASIS: ICD-10-CM

## 2025-01-29 ASSESSMENT — ENCOUNTER SYMPTOMS
VOMITING: 0
SHORTNESS OF BREATH: 0
NAUSEA: 0
COUGH: 0
HYPERTENSION: 1
CHILLS: 0
FEVER: 0

## 2025-01-29 ASSESSMENT — FIBROSIS 4 INDEX: FIB4 SCORE: 1.67

## 2025-01-29 NOTE — PROGRESS NOTES
Subjective     Luis Cagle is a 69 y.o. male who presents with Chronic Kidney Disease and Hypertension            The patient is a pleasant 69-year-old gentleman, recently moved from [facility] to [facility].  He has a long history of recurrent kidney stone(most likely uric acid stone), obstructive uropathy, recently found to have atrophic left kidney.  Patient also developed prostate cancer, status post radiation and chemotherapy, his treatment was complicated by urethral stricture, recently underwent dilation of the urethra in January 2025  Patient has no recent use of NSAIDs or IV contrast exposure    Chronic Kidney Disease  This is a chronic problem. The current episode started more than 1 year ago. The problem occurs constantly. The problem has been waxing and waning. Associated symptoms include urinary symptoms. Pertinent negatives include no chest pain, chills, coughing, fever, nausea or vomiting.   Hypertension  This is a chronic problem. The current episode started more than 1 year ago. The problem is unchanged. The problem is controlled. Pertinent negatives include no chest pain, malaise/fatigue, peripheral edema or shortness of breath. Risk factors for coronary artery disease include male gender. Past treatments include ACE inhibitors and diuretics. The current treatment provides significant improvement. There are no compliance problems.  Hypertensive end-organ damage includes kidney disease. Identifiable causes of hypertension include chronic renal disease.       Review of Systems   Constitutional:  Negative for chills, fever and malaise/fatigue.   Respiratory:  Negative for cough and shortness of breath.    Cardiovascular:  Negative for chest pain and leg swelling.   Gastrointestinal:  Negative for nausea and vomiting.   Genitourinary:  Positive for frequency. Negative for dysuria and urgency.   Musculoskeletal:  Positive for joint pain.              Objective     /72 (BP  "Location: Right arm, Patient Position: Sitting, BP Cuff Size: Adult)   Pulse 60   Temp 37.3 °C (99.1 °F) (Temporal)   Ht 1.778 m (5' 10\")   Wt 107 kg (235 lb)   SpO2 97%   BMI 33.72 kg/m²      Physical Exam  Vitals and nursing note reviewed.   Constitutional:       General: He is awake.      Appearance: He is not ill-appearing.   HENT:      Head: Normocephalic and atraumatic.      Right Ear: External ear normal.      Left Ear: External ear normal.      Nose: Nose normal.      Mouth/Throat:      Pharynx: No oropharyngeal exudate or posterior oropharyngeal erythema.   Eyes:      General:         Right eye: No discharge.         Left eye: No discharge.      Conjunctiva/sclera: Conjunctivae normal.   Cardiovascular:      Rate and Rhythm: Normal rate and regular rhythm.   Pulmonary:      Effort: Pulmonary effort is normal. No respiratory distress.      Breath sounds: Normal breath sounds. No wheezing.   Abdominal:      General: Abdomen is flat. Bowel sounds are normal.   Musculoskeletal:         General: No tenderness.      Cervical back: No rigidity. No muscular tenderness.      Right lower leg: No edema.      Left lower leg: No edema.   Skin:     General: Skin is warm and dry.      Coloration: Skin is not jaundiced.      Findings: No lesion or rash.   Neurological:      General: No focal deficit present.      Mental Status: He is alert and oriented to person, place, and time. Mental status is at baseline.   Psychiatric:         Mood and Affect: Mood normal.         Behavior: Behavior normal.         Thought Content: Thought content normal.       Past Medical History:   Diagnosis Date    Anemia     currently under care of hematologist    Arthritis 2020    Bowel habit changes 2020    small intestine surgery- diarrhea/ constipation    Cancer (HCC) 2017    prostate    Dental disorder     \"teeth are in bad shape\"    Disorder of thyroid 1993    hypothyroid    History of kidney stones 09/20/2024    Passed another " 3-, 2015      Hypertension 2010 ???    Pain 1992 - ongoing    injury- wrists, shoulder, knees, ankles- Arthritis    Renal disorder     left kidney nonfunctioning    Urinary incontinence      Social History     Socioeconomic History    Marital status:      Spouse name: Not on file    Number of children: Not on file    Years of education: Not on file    Highest education level: Not on file   Occupational History    Not on file   Tobacco Use    Smoking status: Former     Current packs/day: 0.00     Average packs/day: 1 pack/day for 24.0 years (24.0 ttl pk-yrs)     Types: Cigarettes     Start date: 1967     Quit date: 1991     Years since quittin.1     Passive exposure: Past    Smokeless tobacco: Never   Vaping Use    Vaping status: Never Used   Substance and Sexual Activity    Alcohol use: Not Currently    Drug use: Yes     Types: Inhaled     Comment: occasional marijuana    Sexual activity: Not Currently     Partners: Female   Other Topics Concern    Not on file   Social History Narrative    Not on file     Social Drivers of Health     Financial Resource Strain: Not on file   Food Insecurity: Not on file   Transportation Needs: Not on file   Physical Activity: Not on file   Stress: Not on file   Social Connections: Not on file   Intimate Partner Violence: Not on file   Housing Stability: Not on file     Family History   Problem Relation Age of Onset    Colon Cancer Mother     Colorectal Cancer Mother     Glaucoma Father     No Known Problems Sister     No Known Problems Sister     No Known Problems Brother     No Known Problems Daughter     No Known Problems Son     No Known Problems Son     Colorectal Cancer Maternal Grandfather      Recent Labs     10/09/24  1215 10/14/24  1236 24  1135   ALBUMIN 4.0 4.0  --    HDL 28*  --   --    TRIGLYCERIDE 165*  --   --    SODIUM 138 140 138   POTASSIUM 4.4 4.9 5.1   CHLORIDE 103 105 110   CO2 21 21 20   BUN 29* 34* 32*   CREATININE  1.81* 1.86* 1.67*                             Assessment & Plan        Assessment & Plan  Stage 3b chronic kidney disease  The etiology is most likely obstructive uropathy in the setting of single functioning kidney  No uremic symptoms  Renal dose of medication  Avoid nephrotoxins  Continue same medication regimen  Patient was advised to call us if symptoms worsen  Repeat labs after urethra stricture dilation  Orders:    Basic Metabolic Panel; Future    CBC WITHOUT DIFFERENTIAL; Future    MICROALB/CREAT RATIO RAND. UR    Essential hypertension, benign  Controlled  Continue same medication regimen  Continue low-sodium diet    Orders:    Basic Metabolic Panel; Future    CBC WITHOUT DIFFERENTIAL; Future    MICROALB/CREAT RATIO RAND. UR    Obstructive uropathy  Follow-up with urology  Orders:    Basic Metabolic Panel; Future    CBC WITHOUT DIFFERENTIAL; Future    MICROALB/CREAT RATIO RAND. UR    Nephrolithiasis  Patient was advised to be on low-sodium diet  Increase water intake 80 to 100 ounces per day  Orders:    Basic Metabolic Panel; Future    CBC WITHOUT DIFFERENTIAL; Future    MICROALB/CREAT RATIO RAND. UR    Atrophic kidney    Orders:    Basic Metabolic Panel; Future    CBC WITHOUT DIFFERENTIAL; Future    MICROALB/CREAT RATIO RAND. UR    History of prostate cancer         Post-traumatic osteoarthritis of both knees  Patient was advised to avoid NSAIDs  Consider evaluation by orthopedic surgeon

## 2025-01-29 NOTE — ASSESSMENT & PLAN NOTE
Controlled  Continue same medication regimen  Continue low-sodium diet    Orders:    Basic Metabolic Panel; Future    CBC WITHOUT DIFFERENTIAL; Future    MICROALB/CREAT RATIO RAND. UR

## 2025-02-20 DIAGNOSIS — I10 ESSENTIAL HYPERTENSION, BENIGN: ICD-10-CM

## 2025-02-20 RX ORDER — CLONIDINE HYDROCHLORIDE 0.1 MG/1
0.1 TABLET ORAL 2 TIMES DAILY
Qty: 60 TABLET | Refills: 1 | Status: SHIPPED | OUTPATIENT
Start: 2025-02-20

## 2025-03-11 ENCOUNTER — TELEPHONE (OUTPATIENT)
Dept: CARDIOLOGY | Facility: MEDICAL CENTER | Age: 70
End: 2025-03-11
Payer: MEDICARE

## 2025-03-11 NOTE — TELEPHONE ENCOUNTER
Called patient in regards to records for NP appointment with Dr. GODOY. To review most recent lab results, ekg, any cardiac testing or ,if patient has been treated by a cardiologist. No answer, left voicemail to call back

## 2025-03-11 NOTE — TELEPHONE ENCOUNTER
Caller: Moise Cagle    Topic/issue: PT has never had a cardiologist,   EKG done w/[facility] 01/2025, Labs in chart w/[facility] and will get more Monday as well.     Callback Number: [phone]    Thank you,   Nereyda BENSON

## 2025-03-17 ENCOUNTER — HOSPITAL ENCOUNTER (OUTPATIENT)
Dept: LAB | Facility: MEDICAL CENTER | Age: 70
End: 2025-03-17
Attending: INTERNAL MEDICINE
Payer: MEDICARE

## 2025-03-17 ENCOUNTER — OFFICE VISIT (OUTPATIENT)
Dept: MEDICAL GROUP | Age: 70
End: 2025-03-17
Payer: MEDICARE

## 2025-03-17 VITALS
WEIGHT: 239 LBS | BODY MASS INDEX: 34.22 KG/M2 | SYSTOLIC BLOOD PRESSURE: 162 MMHG | OXYGEN SATURATION: 99 % | TEMPERATURE: 97.9 F | HEART RATE: 59 BPM | DIASTOLIC BLOOD PRESSURE: 90 MMHG | HEIGHT: 70 IN

## 2025-03-17 DIAGNOSIS — N13.9 OBSTRUCTED, UROPATHY: ICD-10-CM

## 2025-03-17 DIAGNOSIS — N13.9 OBSTRUCTIVE UROPATHY: ICD-10-CM

## 2025-03-17 DIAGNOSIS — N20.0 NEPHROLITHIASIS: ICD-10-CM

## 2025-03-17 DIAGNOSIS — I10 ESSENTIAL HYPERTENSION, BENIGN: ICD-10-CM

## 2025-03-17 DIAGNOSIS — N18.32 STAGE 3B CHRONIC KIDNEY DISEASE: ICD-10-CM

## 2025-03-17 DIAGNOSIS — N26.1 ATROPHIC KIDNEY: ICD-10-CM

## 2025-03-17 LAB
CREAT UR-MCNC: 40.9 MG/DL
ERYTHROCYTE [DISTWIDTH] IN BLOOD BY AUTOMATED COUNT: 55.2 FL (ref 35.9–50)
HCT VFR BLD AUTO: 42 % (ref 42–52)
HGB BLD-MCNC: 13.4 G/DL (ref 14–18)
MCH RBC QN AUTO: 32.2 PG (ref 27–33)
MCHC RBC AUTO-ENTMCNC: 31.9 G/DL (ref 32.3–36.5)
MCV RBC AUTO: 101 FL (ref 81.4–97.8)
MICROALBUMIN UR-MCNC: 4.3 MG/DL
MICROALBUMIN/CREAT UR: 105 MG/G (ref 0–30)
PLATELET # BLD AUTO: 194 K/UL (ref 164–446)
PMV BLD AUTO: 12.7 FL (ref 9–12.9)
RBC # BLD AUTO: 4.16 M/UL (ref 4.7–6.1)
WBC # BLD AUTO: 7.6 K/UL (ref 4.8–10.8)

## 2025-03-17 PROCEDURE — 82043 UR ALBUMIN QUANTITATIVE: CPT

## 2025-03-17 PROCEDURE — 82570 ASSAY OF URINE CREATININE: CPT

## 2025-03-17 PROCEDURE — 80048 BASIC METABOLIC PNL TOTAL CA: CPT

## 2025-03-17 PROCEDURE — 3080F DIAST BP >= 90 MM HG: CPT | Performed by: PHYSICIAN ASSISTANT

## 2025-03-17 PROCEDURE — 99214 OFFICE O/P EST MOD 30 MIN: CPT | Performed by: PHYSICIAN ASSISTANT

## 2025-03-17 PROCEDURE — 3077F SYST BP >= 140 MM HG: CPT | Performed by: PHYSICIAN ASSISTANT

## 2025-03-17 PROCEDURE — 85027 COMPLETE CBC AUTOMATED: CPT

## 2025-03-17 PROCEDURE — 36415 COLL VENOUS BLD VENIPUNCTURE: CPT

## 2025-03-17 RX ORDER — CLONIDINE HYDROCHLORIDE 0.1 MG/1
0.1 TABLET ORAL 2 TIMES DAILY
Qty: 180 TABLET | Refills: 1 | Status: SHIPPED | OUTPATIENT
Start: 2025-03-17 | End: 2025-03-21

## 2025-03-17 RX ORDER — POLYETHYLENE GLYCOL-3350 AND ELECTROLYTES 236; 6.74; 5.86; 2.97; 22.74 G/274.31G; G/274.31G; G/274.31G; G/274.31G; G/274.31G
POWDER, FOR SOLUTION ORAL
COMMUNITY
Start: 2025-02-11 | End: 2025-03-17

## 2025-03-17 ASSESSMENT — FIBROSIS 4 INDEX: FIB4 SCORE: 1.67

## 2025-03-17 NOTE — PROGRESS NOTES
cc: Follow-up hypertension    Subjective:     HPI    History of Present Illness  The patient is a 69-year-old male presenting for a follow-up on hypertension.    He is currently on 150 mg of metoprolol, 40 mg of lisinopril, and 50 mg of hydrochlorothiazide. Clonidine 0.1 mg twice daily was recently added as he does not tolerate amlodipine. He reports satisfactory tolerance to clonidine, although he has not yet increased the dosage to BID. Initially, he experienced dizziness and a sensation of feverishness, similar to his reaction to metoprolol, but these symptoms have since resolved. His home blood pressure readings have predominantly been in the low 140s, even some readings in the mid 130s systolic, with occasional spikes into the 150s. Today's reading was 162. During a recent visit to the nephrologist, his blood pressure was recorded as 112/72. He has a scheduled appointment with a cardiologist on 03/21/2025.    He has a single kidney and has been under the care of a nephrologist, who provided dietary recommendations and ordered urinalysis and BMP tests. He has not had any lab work done since his last procedure. His urinary flow has improved, but he continues to experience frequent urination, necessitating bathroom visits every 1.5 hours throughout the day and night. The frequency increases with physical activity and decreases when seated. He also reports incomplete bladder emptying, necessitating additional bathroom visits within 5 minutes. He has an upcoming appointment with a urologist in 04/2025, who plans to perform a cystoscopy. He has previously tried Flomax without success and has not yet tried Cialis.      Review of systems:  See above.       Current Outpatient Medications:     cloNIDine (CATAPRES) 0.1 MG Tab, TAKE 1 TABLET BY MOUTH TWICE A DAY, Disp: 60 Tablet, Rfl: 1    hydroCHLOROthiazide 50 MG Tab, Take 1 Tablet by mouth every day., Disp: 100 Tablet, Rfl: 3    lisinopril (PRINIVIL) 40 MG tablet, Take  "1 Tablet by mouth every morning., Disp: 100 Tablet, Rfl: 3    liothyronine (CYTOMEL) 5 MCG Tab, TAKE 1 TABLET BY MOUTH EVERY DAY, Disp: 100 Tablet, Rfl: 3    cholestyramine light (PREVALITE) 4 GM/DOSE powder, , Disp: , Rfl:     metoprolol SR (TOPROL XL) 100 MG TABLET SR 24 HR, Take 1.5 Tablets by mouth every day., Disp: 135 Tablet, Rfl: 3    allopurinol (ZYLOPRIM) 100 MG Tab, Take 2 Tablets by mouth every day., Disp: 180 Tablet, Rfl: 3    levothyroxine (SYNTHROID) 200 MCG Tab, Take 1 Tablet by mouth every morning on an empty stomach., Disp: 90 Tablet, Rfl: 3    cholestyramine (QUESTRAN) 4 g packet, Take 4 g by mouth 2 times a day., Disp: 1 Each, Rfl: 1    ASCORBIC ACID PO, Take  by mouth every day., Disp: , Rfl:     calcium citrate 315 mg-vitamin D 5 mcg 315-5 MG-MCG per tablet, Take 1,200 mg by mouth every day., Disp: , Rfl:     MULTIPLE VITAMIN PO, Take 1 Tablet by mouth every day., Disp: , Rfl:     Omega-3 Fatty Acids (FISH OIL) 1000 MG Cap capsule, Take 1,000 mg by mouth every day., Disp: , Rfl:     Nutritional Supplements (NUTRITIONAL SUPPLEMENT PO), Take  by mouth. Heal n Soothe, Disp: , Rfl:     Allergies, past medical history, past surgical history, family history, social history reviewed and updated    Objective:     Vitals: BP (!) 162/90 (BP Location: Left arm, Patient Position: Sitting, BP Cuff Size: Adult)   Pulse (!) 59   Temp 36.6 °C (97.9 °F) (Temporal)   Ht 1.778 m (5' 10\")   Wt 108 kg (239 lb)   SpO2 99%   BMI 34.29 kg/m²   General: Alert, pleasant, NAD  HEENT: Normocephalic. Neck supple.  No carotid bruits   Heart: Regular rate and rhythm.  S1 and S2 normal.  No murmurs appreciated.  Respiratory: Normal respiratory effort.  Clear to auscultation bilaterally.  Skin: Warm, dry, no rashes.  Psych:  Affect/mood is normal, judgement is good, memory is intact, grooming is appropriate.    Assessment/Plan:     Luis was seen today for hypertension.    Diagnoses and all orders for this " visit:    Essential hypertension, benign  Still uncontrolled, but it is improving.  Will be following up with cardiology in 4 days.  Did advise to increase 0.1 mg clonidine twice daily, continue 40 mg of lisinopril, 50 mg hydrochlorothiazide, 150 mg metoprolol-does not tolerate amlodipine or higher dosages of metoprolol.  Will defer additional hypertensive management to cardiology    Obstructed, uropathy  Improvement in outflow, however still experiencing urinary frequency and urgency, nocturia.  Did discuss potentially starting 5 mg of Cialis as he has not tried this yet, prior to his urology appointment in 4 weeks.  He politely declines and will follow-up with urology for cystoscopy.    Stage 3b chronic kidney disease  Did establish with nephrology.  Does have labs ordered, advised to have these completed.  Continue to follow-up with nephrology every 6 months      Return in about 3 months (around 6/17/2025) for AWV.

## 2025-03-18 LAB
ANION GAP SERPL CALC-SCNC: 10 MMOL/L (ref 7–16)
BUN SERPL-MCNC: 34 MG/DL (ref 8–22)
CALCIUM SERPL-MCNC: 10 MG/DL (ref 8.5–10.5)
CHLORIDE SERPL-SCNC: 108 MMOL/L (ref 96–112)
CO2 SERPL-SCNC: 20 MMOL/L (ref 20–33)
CREAT SERPL-MCNC: 1.76 MG/DL (ref 0.5–1.4)
GFR SERPLBLD CREATININE-BSD FMLA CKD-EPI: 41 ML/MIN/1.73 M 2
GLUCOSE SERPL-MCNC: 87 MG/DL (ref 65–99)
POTASSIUM SERPL-SCNC: 5.1 MMOL/L (ref 3.6–5.5)
SODIUM SERPL-SCNC: 138 MMOL/L (ref 135–145)

## 2025-03-20 ASSESSMENT — ENCOUNTER SYMPTOMS
ABDOMINAL PAIN: 0
NIGHT SWEATS: 0
FOCAL WEAKNESS: 0
FEVER: 0
SHORTNESS OF BREATH: 0
NAUSEA: 0
SYNCOPE: 0
NEAR-SYNCOPE: 0
PND: 0
DIARRHEA: 0
WEAKNESS: 0
WHEEZING: 0
PALPITATIONS: 0
IRREGULAR HEARTBEAT: 0
VOMITING: 0
DIZZINESS: 0
COUGH: 0
ORTHOPNEA: 0
DYSPNEA ON EXERTION: 0

## 2025-03-21 ENCOUNTER — OFFICE VISIT (OUTPATIENT)
Dept: CARDIOLOGY | Facility: MEDICAL CENTER | Age: 70
End: 2025-03-21
Attending: PHYSICIAN ASSISTANT
Payer: MEDICARE

## 2025-03-21 VITALS
OXYGEN SATURATION: 97 % | RESPIRATION RATE: 16 BRPM | WEIGHT: 238 LBS | HEART RATE: 57 BPM | HEIGHT: 70 IN | DIASTOLIC BLOOD PRESSURE: 98 MMHG | SYSTOLIC BLOOD PRESSURE: 168 MMHG | BODY MASS INDEX: 34.07 KG/M2

## 2025-03-21 DIAGNOSIS — N18.9 CHRONIC KIDNEY DISEASE, UNSPECIFIED CKD STAGE: ICD-10-CM

## 2025-03-21 DIAGNOSIS — I10 ESSENTIAL HYPERTENSION: ICD-10-CM

## 2025-03-21 PROCEDURE — 99213 OFFICE O/P EST LOW 20 MIN: CPT | Performed by: STUDENT IN AN ORGANIZED HEALTH CARE EDUCATION/TRAINING PROGRAM

## 2025-03-21 PROCEDURE — 99204 OFFICE O/P NEW MOD 45 MIN: CPT | Performed by: STUDENT IN AN ORGANIZED HEALTH CARE EDUCATION/TRAINING PROGRAM

## 2025-03-21 RX ORDER — CARVEDILOL 25 MG/1
25 TABLET ORAL 2 TIMES DAILY WITH MEALS
Qty: 180 TABLET | Refills: 3 | Status: SHIPPED | OUTPATIENT
Start: 2025-03-21

## 2025-03-21 ASSESSMENT — FIBROSIS 4 INDEX: FIB4 SCORE: 1.8

## 2025-03-21 NOTE — PROGRESS NOTES
Cardiology Initial Consultation Note    Date of note:    3/21/2025    Primary Care Provider: Debbie Mays P.A.-C.  Referring Provider: Debbie Mays P.A.-C.     Patient Name: Moise Cagle   YOB: 1955  MRN:              [id number]    Chief Complaint: Hypertension    History of Present Illness: Mr. Moise Cagle is a 69 y.o. male whose current medical problems include hypertension, hypothyroidism, and CKD who is here for cardiac consultation for hypertension.    The patient presents today to establish care.  The patient reports that his blood pressure has been difficult to control.  His home BP remains elevated.  He denies any chest pain or shortness of breath on exertion although does not exert much due to knee pain.  He denies any orthopnea, PND, or leg swelling.  No palpitations.  No syncope or presyncopal episodes.      Cardiovascular Risk Factors:  1. Smoking status: Former smoker  2. Type II Diabetes Mellitus: None/not recently checked  3. Hypertension: On medication  4. Dyslipidemia: Diet controlled  Cholesterol,Tot   Date Value Ref Range Status   10/09/2024 131 100 - 199 mg/dL Final     LDL   Date Value Ref Range Status   10/09/2024 70 <100 mg/dL Final     HDL   Date Value Ref Range Status   10/09/2024 28 (A) >=40 mg/dL Final     Triglycerides   Date Value Ref Range Status   10/09/2024 165 (H) 0 - 149 mg/dL Final     5. Family history of early Coronary Artery Disease in a first degree relative (Male less than 55 years of age; Female less than 65 years of age): None  6.  Obesity and/or Metabolic Syndrome: Body mass index is 34.15 kg/m².  7. Sedentary lifestyle: Not active     Review of Systems   Constitutional: Negative for fever, malaise/fatigue and night sweats.   Cardiovascular:  Negative for chest pain, dyspnea on exertion, irregular heartbeat, leg swelling, near-syncope, orthopnea, palpitations, paroxysmal nocturnal dyspnea and syncope.   Respiratory:  Negative for  "cough, shortness of breath and wheezing.    Gastrointestinal:  Negative for abdominal pain, diarrhea, nausea and vomiting.   Neurological:  Negative for dizziness, focal weakness and weakness.         All other systems reviewed and are negative.       Current Outpatient Medications   Medication Sig Dispense Refill    carvedilol (COREG) 25 MG Tab Take 1 Tablet by mouth 2 times a day with meals. 180 Tablet 3    hydroCHLOROthiazide 50 MG Tab Take 1 Tablet by mouth every day. 100 Tablet 3    lisinopril (PRINIVIL) 40 MG tablet Take 1 Tablet by mouth every morning. 100 Tablet 3    liothyronine (CYTOMEL) 5 MCG Tab TAKE 1 TABLET BY MOUTH EVERY  Tablet 3    cholestyramine light (PREVALITE) 4 GM/DOSE powder       allopurinol (ZYLOPRIM) 100 MG Tab Take 2 Tablets by mouth every day. 180 Tablet 3    levothyroxine (SYNTHROID) 200 MCG Tab Take 1 Tablet by mouth every morning on an empty stomach. 90 Tablet 3    cholestyramine (QUESTRAN) 4 g packet Take 4 g by mouth 2 times a day. 1 Each 1    ASCORBIC ACID PO Take  by mouth every day.      calcium citrate 315 mg-vitamin D 5 mcg 315-5 MG-MCG per tablet Take 1,200 mg by mouth every day.      MULTIPLE VITAMIN PO Take 1 Tablet by mouth every day.      Omega-3 Fatty Acids (FISH OIL) 1000 MG Cap capsule Take 1,000 mg by mouth every day.      Nutritional Supplements (NUTRITIONAL SUPPLEMENT PO) Take  by mouth. Heal n Soothe       No current facility-administered medications for this visit.         Allergies   Allergen Reactions    Amlodipine Unspecified     Swelling of feet and ankles    Hydrocodone-Acetaminophen Unspecified     Teeth grinding anxious (vicodin); anxious      Morphine Unspecified     Hallucination       Other Environmental Runny Nose and Itching     Seasonal allergies       Physical Exam:  Ambulatory Vitals  BP (!) 168/98 (BP Location: Left arm, Patient Position: Sitting, BP Cuff Size: Adult)   Pulse (!) 57   Resp 16   Ht 1.778 m (5' 10\")   Wt 108 kg (238 lb)   " "SpO2 97%    Oxygen Therapy:  Pulse Oximetry: 97 %  BP Readings from Last 4 Encounters:   03/21/25 (!) 168/98   03/17/25 (!) 162/90   01/29/25 112/72   01/28/25 (!) 160/82       Weight/BMI: Body mass index is 34.15 kg/m².  Wt Readings from Last 4 Encounters:   03/21/25 108 kg (238 lb)   03/17/25 108 kg (239 lb)   01/29/25 107 kg (235 lb)   01/28/25 108 kg (237 lb)         General: Well appearing and in no apparent distress  Eyes: nl conjunctiva, no icteric sclera  ENT: normal external appearance of ears, nose, and throat  Neck: no visible JVP,  no carotid bruits  Lungs: normal respiratory effort, CTAB  Heart: RRR, no murmurs, no rubs or gallops,  no edema bilateral lower extremities. No LV/RV heave on cardiac palpatation. + bilateral radial pulses.  + bilateral dp pulses.   Abdomen: soft, non tender, non distended, no masses, normal bowel sounds.  No HSM.  Extremities/MSK: no clubbing, no cyanosis  Neurological: No focal sensory deficits  Psychiatric: Appropriate affect, A/O x 3, intact judgement and insight  Skin: Warm extremities      Lab Data Review:  Lab Results   Component Value Date/Time    CHOLSTRLTOT 131 10/09/2024 12:15 PM    LDL 70 10/09/2024 12:15 PM    HDL 28 (A) 10/09/2024 12:15 PM    TRIGLYCERIDE 165 (H) 10/09/2024 12:15 PM       Lab Results   Component Value Date/Time    SODIUM 138 03/17/2025 12:11 PM    POTASSIUM 5.1 03/17/2025 12:11 PM    CHLORIDE 108 03/17/2025 12:11 PM    CO2 20 03/17/2025 12:11 PM    GLUCOSE 87 03/17/2025 12:11 PM    BUN 34 (H) 03/17/2025 12:11 PM    CREATININE 1.76 (H) 03/17/2025 12:11 PM     Lab Results   Component Value Date/Time    ALKPHOSPHAT 72 10/14/2024 12:36 PM    ASTSGOT 13 10/14/2024 12:36 PM    ALTSGPT 7 10/14/2024 12:36 PM    TBILIRUBIN 0.4 10/14/2024 12:36 PM      Lab Results   Component Value Date/Time    WBC 7.6 03/17/2025 12:11 PM    HEMOGLOBIN 13.4 (L) 03/17/2025 12:11 PM     No results found for: \"HBA1C\"      Cardiac Imaging and Procedures Review:    EKG dated " 12/31/2024: My personal interpretation is sinus bradycardia    No prior echocardiogram on file    Assessment & Plan     1. Essential hypertension  carvedilol (COREG) 25 MG Tab    EC-ECHOCARDIOGRAM COMPLETE W/O CONT      2. Chronic kidney disease, unspecified CKD stage              Shared Medical Decision Making:  Hypertension  BP elevated this visit.  Home BP also elevated.  -Continue lisinopril 40mg daily  -Continue HCTZ 50mg daily  -Stop metoprolol and clonidine  -Start carvedilol 25mg twice daily  -Counseled the patient to measure BP at home. Goal is less than 130/80. If BP remains above 130/80 persistently, we will add hydralaizne.   -Patient allergic to amlodipine.  -Counseled on heart healthy diet and low-salt diet.  -Will obtain echocardiogram to assess LVEF and any structural abnormalities given uncontrolled hypertension.    CKD  -Continue to follow with nephrology    All of the patient's excellent questions were answered to the best of my knowledge and to  his satisfaction.  It was a pleasure seeing Mr. Moise Cagle in my clinic today. Return in about 3 months (around 6/21/2025). Patient is aware to call the cardiology clinic with any questions or concerns.      Karine Liriano MD  [facility]  [place] [facility], [address]  [address] [address]  Phone: [phone]  Fax: [phone]

## 2025-03-26 ENCOUNTER — HOSPITAL ENCOUNTER (OUTPATIENT)
Dept: CARDIOLOGY | Facility: MEDICAL CENTER | Age: 70
End: 2025-03-26
Attending: STUDENT IN AN ORGANIZED HEALTH CARE EDUCATION/TRAINING PROGRAM
Payer: MEDICARE

## 2025-03-26 DIAGNOSIS — I10 ESSENTIAL HYPERTENSION: ICD-10-CM

## 2025-03-26 PROCEDURE — 93306 TTE W/DOPPLER COMPLETE: CPT

## 2025-03-27 ENCOUNTER — RESULTS FOLLOW-UP (OUTPATIENT)
Dept: CARDIOLOGY | Facility: MEDICAL CENTER | Age: 70
End: 2025-03-27

## 2025-03-27 LAB
LV EJECT FRACT MOD 2C 99903: 57.32
LV EJECT FRACT MOD 4C 99902: 60.71
LV EJECT FRACT MOD BP 99901: 58.75

## 2025-04-10 ENCOUNTER — PATIENT MESSAGE (OUTPATIENT)
Dept: CARDIOLOGY | Facility: MEDICAL CENTER | Age: 70
End: 2025-04-10
Payer: MEDICARE

## 2025-04-10 DIAGNOSIS — I10 ESSENTIAL HYPERTENSION: ICD-10-CM

## 2025-04-10 NOTE — PATIENT COMMUNICATION
HK: Please advise on BP readings  Pt BP 2 weeks post Coreg is average of 151/95,  Range of BP:  SBP between 130-150s and DBP between 84-96   None

## 2025-04-14 RX ORDER — HYDRALAZINE HYDROCHLORIDE 25 MG/1
25 TABLET, FILM COATED ORAL 2 TIMES DAILY
Qty: 180 TABLET | Refills: 0 | Status: SHIPPED | OUTPATIENT
Start: 2025-04-14 | End: 2025-04-28 | Stop reason: SDUPTHER

## 2025-04-14 NOTE — PATIENT COMMUNICATION
Karine Liriano M.D. to Me (Selected Message)    4/11/25 10:44 AM  Let's add hydralazine 25mg twice daily

## 2025-04-15 ENCOUNTER — PROCEDURE VISIT (OUTPATIENT)
Dept: UROLOGY | Facility: MEDICAL CENTER | Age: 70
End: 2025-04-15
Payer: MEDICARE

## 2025-04-15 DIAGNOSIS — N13.5 URETERAL STRICTURE: ICD-10-CM

## 2025-04-15 DIAGNOSIS — R31.0 GROSS HEMATURIA: ICD-10-CM

## 2025-04-15 DIAGNOSIS — N36.9 URETHRAL LESION: ICD-10-CM

## 2025-04-15 DIAGNOSIS — Q62.39 UPJ OBSTRUCTION, CONGENITAL: ICD-10-CM

## 2025-04-15 LAB
APPEARANCE UR: NORMAL
BILIRUB UR STRIP-MCNC: NORMAL MG/DL
COLOR UR AUTO: NORMAL
GLUCOSE UR STRIP.AUTO-MCNC: NORMAL MG/DL
KETONES UR STRIP.AUTO-MCNC: NORMAL MG/DL
LEUKOCYTE ESTERASE UR QL STRIP.AUTO: NORMAL
NITRITE UR QL STRIP.AUTO: NORMAL
PH UR STRIP.AUTO: 6 [PH] (ref 5–8)
PROT UR QL STRIP: NORMAL MG/DL
RBC UR QL AUTO: NORMAL
SP GR UR STRIP.AUTO: 1.02
UROBILINOGEN UR STRIP-MCNC: 0.2 MG/DL

## 2025-04-15 PROCEDURE — 81002 URINALYSIS NONAUTO W/O SCOPE: CPT | Performed by: UROLOGY

## 2025-04-15 PROCEDURE — 52000 CYSTOURETHROSCOPY: CPT | Performed by: UROLOGY

## 2025-04-15 NOTE — PROGRESS NOTES
Flexible Cystoscopy Report    Patient name: Moise Cagle    Age: 69 y.o.    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 male with a history of prostate cancer treated with radiotherapy, found later to have left hydronephrosis and a non-functional left kidney as well as a membranous urethral stricture. He was treated 3 months ago with cystoscopy, DVIU, and drug coated balloon dilation of the stricture. 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     Procedure details: After providing a urine sample for a urinalysis to rule out active urinary tract infection, the patient was brought to the procedure room and placed in supine position. The external genitalia were then prepped and draped in usual sterile fashion. Lidocaine jelly was administered via the urethral meatus and held in place for approximately two minutes for local anesthesia.    After a procedure time-out to confirm the proper patient, procedure, consent, and availability of all necessary equipment, the case began by inserting a 16-[place] flexible cystoscope via the urethral meatus. Findings included:    Anterior urethra: 1 cm diameter papillary mass in the distal bulbous urethra; urethra otherwise normal  Posterior urethra: normal membranous urethra is fully patent; prostatic urethra with mild hypertrophy but no significant obstruction  Bladder:  Bladder mucosa normal without masses, diverticuli, or trabeculations. No stones or other foreign bodies noted. Bilateral ureteral orifices were identified and found to have efflux of clear yellow urine.     The cystoscope was then carefully  withdrawn from the bladder and urethra. The patient was cleaned and dried and allowed to void.     Assessment/Plan:  Membranous urethral stricture remains widely patent, but he has a recurrent urethral mass that most likely is another condyloma, but the appearance is very similar to a urothelial carcinoma. I recommended resection, and we'll arrange for transurethral resection (likely with a holmium laser for improved hemostasis) soon. I'll also investigate any possible treatments to decrease recurrence if this too turns out to be a urethral condyloma associated with low-risk HPV. I explained that I'm aware of some studies using HPV vaccines both systemically and intralesional for cutaneous lesions, but will have to reach out to local dermatologists and pathologists to discuss any other preventative strategies.     Castro Burleson MD

## 2025-04-23 ENCOUNTER — APPOINTMENT (OUTPATIENT)
Dept: ADMISSIONS | Facility: MEDICAL CENTER | Age: 70
End: 2025-04-23
Attending: INTERNAL MEDICINE
Payer: MEDICARE

## 2025-04-23 ENCOUNTER — PATIENT MESSAGE (OUTPATIENT)
Dept: CARDIOLOGY | Facility: MEDICAL CENTER | Age: 70
End: 2025-04-23
Payer: MEDICARE

## 2025-04-23 DIAGNOSIS — I10 ESSENTIAL HYPERTENSION: ICD-10-CM

## 2025-04-23 NOTE — TELEPHONE ENCOUNTER
HK- Please advise on patient message, patient sent in BP log for the past week. Has been taking the hydralazine for the past week. Stated he has no symptoms and is taking all other medications. Any further recs? Thank you.

## 2025-04-28 RX ORDER — HYDRALAZINE HYDROCHLORIDE 25 MG/1
50 TABLET, FILM COATED ORAL 2 TIMES DAILY
Qty: 360 TABLET | Refills: 1 | Status: SHIPPED | OUTPATIENT
Start: 2025-04-28

## 2025-04-29 ENCOUNTER — PRE-ADMISSION TESTING (OUTPATIENT)
Dept: ADMISSIONS | Facility: MEDICAL CENTER | Age: 70
End: 2025-04-29
Attending: INTERNAL MEDICINE
Payer: MEDICARE

## 2025-04-29 VITALS — HEIGHT: 70 IN | WEIGHT: 236 LBS | BODY MASS INDEX: 33.79 KG/M2

## 2025-04-29 DIAGNOSIS — Z01.812 PRE-OPERATIVE LABORATORY EXAMINATION: ICD-10-CM

## 2025-04-29 ASSESSMENT — FIBROSIS 4 INDEX: FIB4 SCORE: 1.8

## 2025-04-29 NOTE — PREPROCEDURE INSTRUCTIONS
Preadmit appointment completed with pt including all anesthesia instructions and directions.  Medication reconcilation and instructions given per anesthesia protocol. Handouts/Map sent to pt via [employer].  [facility]

## 2025-04-29 NOTE — PREADMIT AVS NOTE
Current Medications   Medication Instructions    hydrALAZINE (APRESOLINE) 25 MG Tab Continue taking as prescribed.    carvedilol (COREG) 25 MG Tab Continue taking medication as prescribed, including morning of procedure     hydroCHLOROthiazide 50 MG Tab Stop 24 hours before surgery    lisinopril (PRINIVIL) 40 MG tablet Stop 24 hours before surgery    liothyronine (CYTOMEL) 5 MCG Tab Continue taking as prescribed.    cholestyramine light (PREVALITE) 4 GM/DOSE powder Stop 24 hours before surgery    allopurinol (ZYLOPRIM) 100 MG Tab Continue taking as prescribed.    levothyroxine (SYNTHROID) 200 MCG Tab Continue taking medication as prescribed, including morning of procedure     ASCORBIC ACID PO Stop 7 days before surgery    calcium citrate 315 mg-vitamin D 5 mcg 315-5 MG-MCG per tablet Stop 7 days before surgery    MULTIPLE VITAMIN PO Stop 7 days before surgery    Omega-3 Fatty Acids (FISH OIL) 1000 MG Cap capsule Stop 7 days before surgery    Nutritional Supplements (NUTRITIONAL SUPPLEMENT PO) Stop 7 days before surgery

## 2025-05-06 ENCOUNTER — PATIENT MESSAGE (OUTPATIENT)
Dept: CARDIOLOGY | Facility: MEDICAL CENTER | Age: 70
End: 2025-05-06
Payer: MEDICARE

## 2025-05-06 DIAGNOSIS — I10 ESSENTIAL HYPERTENSION: ICD-10-CM

## 2025-05-06 RX ORDER — TERAZOSIN 1 MG/1
1 CAPSULE ORAL NIGHTLY
Qty: 90 CAPSULE | Refills: 3 | Status: SHIPPED | OUTPATIENT
Start: 2025-05-06

## 2025-05-06 NOTE — PATIENT COMMUNICATION
To LINDA, can you please advise for HK? Pt reports BLE edema and SBP up to 150's with recent increase in Hydralazine dose to 50 mg BID.

## 2025-05-12 ENCOUNTER — HOSPITAL ENCOUNTER (OUTPATIENT)
Dept: LAB | Facility: MEDICAL CENTER | Age: 70
End: 2025-05-12
Attending: UROLOGY
Payer: MEDICARE

## 2025-05-12 DIAGNOSIS — Z01.812 PRE-OPERATIVE LABORATORY EXAMINATION: ICD-10-CM

## 2025-05-12 LAB
ANION GAP SERPL CALC-SCNC: 8 MMOL/L (ref 7–16)
APPEARANCE UR: CLEAR
BACTERIA #/AREA URNS HPF: ABNORMAL /HPF
BILIRUB UR QL STRIP.AUTO: NEGATIVE
BUN SERPL-MCNC: 38 MG/DL (ref 8–22)
CALCIUM SERPL-MCNC: 9.9 MG/DL (ref 8.5–10.5)
CASTS URNS QL MICRO: ABNORMAL /LPF (ref 0–2)
CHLORIDE SERPL-SCNC: 109 MMOL/L (ref 96–112)
CO2 SERPL-SCNC: 20 MMOL/L (ref 20–33)
COLOR UR: YELLOW
CREAT SERPL-MCNC: 1.81 MG/DL (ref 0.5–1.4)
EPITHELIAL CELLS 1715: ABNORMAL /HPF (ref 0–5)
ERYTHROCYTE [DISTWIDTH] IN BLOOD BY AUTOMATED COUNT: 53.9 FL (ref 35.9–50)
GFR SERPLBLD CREATININE-BSD FMLA CKD-EPI: 39 ML/MIN/1.73 M 2
GLUCOSE SERPL-MCNC: 93 MG/DL (ref 65–99)
GLUCOSE UR STRIP.AUTO-MCNC: NEGATIVE MG/DL
HCT VFR BLD AUTO: 36.9 % (ref 42–52)
HGB BLD-MCNC: 12.3 G/DL (ref 14–18)
KETONES UR STRIP.AUTO-MCNC: NEGATIVE MG/DL
LEUKOCYTE ESTERASE UR QL STRIP.AUTO: ABNORMAL
MCH RBC QN AUTO: 33.2 PG (ref 27–33)
MCHC RBC AUTO-ENTMCNC: 33.3 G/DL (ref 32.3–36.5)
MCV RBC AUTO: 99.7 FL (ref 81.4–97.8)
MICRO URNS: ABNORMAL
NITRITE UR QL STRIP.AUTO: NEGATIVE
PH UR STRIP.AUTO: 6 [PH] (ref 5–8)
PLATELET # BLD AUTO: 161 K/UL (ref 164–446)
PMV BLD AUTO: 12.7 FL (ref 9–12.9)
POTASSIUM SERPL-SCNC: 4.9 MMOL/L (ref 3.6–5.5)
PROT UR QL STRIP: NEGATIVE MG/DL
RBC # BLD AUTO: 3.7 M/UL (ref 4.7–6.1)
RBC # URNS HPF: ABNORMAL /HPF (ref 0–2)
RBC UR QL AUTO: ABNORMAL
SODIUM SERPL-SCNC: 137 MMOL/L (ref 135–145)
SP GR UR STRIP.AUTO: 1.01
UROBILINOGEN UR STRIP.AUTO-MCNC: 0.2 EU/DL
WBC # BLD AUTO: 6 K/UL (ref 4.8–10.8)
WBC #/AREA URNS HPF: ABNORMAL /HPF

## 2025-05-12 PROCEDURE — 85027 COMPLETE CBC AUTOMATED: CPT | Mod: GA

## 2025-05-12 PROCEDURE — 80048 BASIC METABOLIC PNL TOTAL CA: CPT

## 2025-05-12 PROCEDURE — 87086 URINE CULTURE/COLONY COUNT: CPT | Mod: GA

## 2025-05-12 PROCEDURE — 85730 THROMBOPLASTIN TIME PARTIAL: CPT | Mod: GA

## 2025-05-12 PROCEDURE — 36415 COLL VENOUS BLD VENIPUNCTURE: CPT

## 2025-05-12 PROCEDURE — 85610 PROTHROMBIN TIME: CPT | Mod: GA

## 2025-05-12 PROCEDURE — 81001 URINALYSIS AUTO W/SCOPE: CPT

## 2025-05-13 LAB
APTT PPP: 36.6 SEC (ref 24.7–36)
INR PPP: 1.09 (ref 0.87–1.13)
PROTHROMBIN TIME: 14.1 SEC (ref 12–14.6)

## 2025-05-14 LAB
BACTERIA UR CULT: NORMAL
SIGNIFICANT IND 70042: NORMAL
SITE SITE: NORMAL
SOURCE SOURCE: NORMAL

## 2025-05-20 ENCOUNTER — TELEPHONE (OUTPATIENT)
Dept: UROLOGY | Facility: MEDICAL CENTER | Age: 70
End: 2025-05-20
Payer: MEDICARE

## 2025-05-21 ENCOUNTER — ANESTHESIA (OUTPATIENT)
Dept: SURGERY | Facility: MEDICAL CENTER | Age: 70
End: 2025-05-21
Payer: MEDICARE

## 2025-05-21 ENCOUNTER — HOSPITAL ENCOUNTER (OUTPATIENT)
Facility: MEDICAL CENTER | Age: 70
End: 2025-05-21
Attending: UROLOGY | Admitting: UROLOGY
Payer: MEDICARE

## 2025-05-21 ENCOUNTER — ANESTHESIA EVENT (OUTPATIENT)
Dept: SURGERY | Facility: MEDICAL CENTER | Age: 70
End: 2025-05-21
Payer: MEDICARE

## 2025-05-21 VITALS
HEART RATE: 70 BPM | RESPIRATION RATE: 19 BRPM | HEIGHT: 70 IN | BODY MASS INDEX: 34.78 KG/M2 | DIASTOLIC BLOOD PRESSURE: 101 MMHG | TEMPERATURE: 96.8 F | WEIGHT: 242.95 LBS | SYSTOLIC BLOOD PRESSURE: 182 MMHG | OXYGEN SATURATION: 93 %

## 2025-05-21 LAB — PATHOLOGY CONSULT NOTE: NORMAL

## 2025-05-21 PROCEDURE — 88341 IMHCHEM/IMCYTCHM EA ADD ANTB: CPT | Mod: 91 | Performed by: PATHOLOGY

## 2025-05-21 PROCEDURE — 700105 HCHG RX REV CODE 258: Performed by: UROLOGY

## 2025-05-21 PROCEDURE — 700111 HCHG RX REV CODE 636 W/ 250 OVERRIDE (IP): Performed by: ANESTHESIOLOGY

## 2025-05-21 PROCEDURE — 160046 HCHG PACU - 1ST 60 MINS PHASE II: Performed by: UROLOGY

## 2025-05-21 PROCEDURE — 52204 CYSTOSCOPY W/BIOPSY(S): CPT | Performed by: UROLOGY

## 2025-05-21 PROCEDURE — 88342 IMHCHEM/IMCYTCHM 1ST ANTB: CPT | Performed by: PATHOLOGY

## 2025-05-21 PROCEDURE — 88305 TISSUE EXAM BY PATHOLOGIST: CPT | Performed by: PATHOLOGY

## 2025-05-21 PROCEDURE — 160048 HCHG OR STATISTICAL LEVEL 1-5: Performed by: UROLOGY

## 2025-05-21 PROCEDURE — 160002 HCHG RECOVERY MINUTES (STAT): Performed by: UROLOGY

## 2025-05-21 PROCEDURE — 88341 IMHCHEM/IMCYTCHM EA ADD ANTB: CPT | Mod: 26 | Performed by: PATHOLOGY

## 2025-05-21 PROCEDURE — 160025 RECOVERY II MINUTES (STATS): Performed by: UROLOGY

## 2025-05-21 PROCEDURE — 160028 HCHG SURGERY MINUTES - 1ST 30 MINS LEVEL 3: Performed by: UROLOGY

## 2025-05-21 PROCEDURE — 160039 HCHG SURGERY MINUTES - EA ADDL 1 MIN LEVEL 3: Performed by: UROLOGY

## 2025-05-21 PROCEDURE — 88305 TISSUE EXAM BY PATHOLOGIST: CPT | Mod: 26 | Performed by: PATHOLOGY

## 2025-05-21 PROCEDURE — 160009 HCHG ANES TIME/MIN: Performed by: UROLOGY

## 2025-05-21 PROCEDURE — 700101 HCHG RX REV CODE 250: Performed by: ANESTHESIOLOGY

## 2025-05-21 PROCEDURE — 88342 IMHCHEM/IMCYTCHM 1ST ANTB: CPT | Mod: 26 | Performed by: PATHOLOGY

## 2025-05-21 PROCEDURE — 160015 HCHG STAT PREOP MINUTES: Performed by: UROLOGY

## 2025-05-21 PROCEDURE — 160035 HCHG PACU - 1ST 60 MINS PHASE I: Performed by: UROLOGY

## 2025-05-21 PROCEDURE — 700111 HCHG RX REV CODE 636 W/ 250 OVERRIDE (IP): Mod: JZ | Performed by: ANESTHESIOLOGY

## 2025-05-21 RX ORDER — OXYCODONE AND ACETAMINOPHEN 5; 325 MG/1; MG/1
1 TABLET ORAL
Status: DISCONTINUED | OUTPATIENT
Start: 2025-05-21 | End: 2025-05-21 | Stop reason: HOSPADM

## 2025-05-21 RX ORDER — ONDANSETRON 2 MG/ML
4 INJECTION INTRAMUSCULAR; INTRAVENOUS
Status: DISCONTINUED | OUTPATIENT
Start: 2025-05-21 | End: 2025-05-21 | Stop reason: HOSPADM

## 2025-05-21 RX ORDER — SODIUM CHLORIDE, SODIUM LACTATE, POTASSIUM CHLORIDE, CALCIUM CHLORIDE 600; 310; 30; 20 MG/100ML; MG/100ML; MG/100ML; MG/100ML
INJECTION, SOLUTION INTRAVENOUS CONTINUOUS
Status: DISCONTINUED | OUTPATIENT
Start: 2025-05-21 | End: 2025-05-21 | Stop reason: HOSPADM

## 2025-05-21 RX ORDER — HYDROMORPHONE HYDROCHLORIDE 1 MG/ML
0.1 INJECTION, SOLUTION INTRAMUSCULAR; INTRAVENOUS; SUBCUTANEOUS
Status: DISCONTINUED | OUTPATIENT
Start: 2025-05-21 | End: 2025-05-21 | Stop reason: HOSPADM

## 2025-05-21 RX ORDER — IPRATROPIUM BROMIDE AND ALBUTEROL SULFATE 2.5; .5 MG/3ML; MG/3ML
3 SOLUTION RESPIRATORY (INHALATION)
Status: DISCONTINUED | OUTPATIENT
Start: 2025-05-21 | End: 2025-05-21 | Stop reason: HOSPADM

## 2025-05-21 RX ORDER — EPHEDRINE SULFATE 50 MG/ML
5 INJECTION, SOLUTION INTRAVENOUS
Status: DISCONTINUED | OUTPATIENT
Start: 2025-05-21 | End: 2025-05-21 | Stop reason: HOSPADM

## 2025-05-21 RX ORDER — LABETALOL HYDROCHLORIDE 5 MG/ML
INJECTION, SOLUTION INTRAVENOUS PRN
Status: DISCONTINUED | OUTPATIENT
Start: 2025-05-21 | End: 2025-05-21 | Stop reason: SURG

## 2025-05-21 RX ORDER — ONDANSETRON 2 MG/ML
INJECTION INTRAMUSCULAR; INTRAVENOUS PRN
Status: DISCONTINUED | OUTPATIENT
Start: 2025-05-21 | End: 2025-05-21 | Stop reason: SURG

## 2025-05-21 RX ORDER — METOPROLOL TARTRATE 1 MG/ML
1 INJECTION, SOLUTION INTRAVENOUS
Status: DISCONTINUED | OUTPATIENT
Start: 2025-05-21 | End: 2025-05-21 | Stop reason: HOSPADM

## 2025-05-21 RX ORDER — HYDROMORPHONE HYDROCHLORIDE 1 MG/ML
0.4 INJECTION, SOLUTION INTRAMUSCULAR; INTRAVENOUS; SUBCUTANEOUS
Status: DISCONTINUED | OUTPATIENT
Start: 2025-05-21 | End: 2025-05-21 | Stop reason: HOSPADM

## 2025-05-21 RX ORDER — HYDROMORPHONE HYDROCHLORIDE 1 MG/ML
0.2 INJECTION, SOLUTION INTRAMUSCULAR; INTRAVENOUS; SUBCUTANEOUS
Status: DISCONTINUED | OUTPATIENT
Start: 2025-05-21 | End: 2025-05-21 | Stop reason: HOSPADM

## 2025-05-21 RX ORDER — MIDAZOLAM HYDROCHLORIDE 1 MG/ML
INJECTION INTRAMUSCULAR; INTRAVENOUS PRN
Status: DISCONTINUED | OUTPATIENT
Start: 2025-05-21 | End: 2025-05-21 | Stop reason: SURG

## 2025-05-21 RX ORDER — MEPERIDINE HYDROCHLORIDE 25 MG/ML
12.5 INJECTION INTRAMUSCULAR; INTRAVENOUS; SUBCUTANEOUS
Status: DISCONTINUED | OUTPATIENT
Start: 2025-05-21 | End: 2025-05-21 | Stop reason: HOSPADM

## 2025-05-21 RX ORDER — HYDRALAZINE HYDROCHLORIDE 20 MG/ML
5 INJECTION INTRAMUSCULAR; INTRAVENOUS
Status: DISCONTINUED | OUTPATIENT
Start: 2025-05-21 | End: 2025-05-21 | Stop reason: HOSPADM

## 2025-05-21 RX ORDER — HALOPERIDOL 5 MG/ML
1 INJECTION INTRAMUSCULAR
Status: DISCONTINUED | OUTPATIENT
Start: 2025-05-21 | End: 2025-05-21 | Stop reason: HOSPADM

## 2025-05-21 RX ORDER — LABETALOL HYDROCHLORIDE 5 MG/ML
5 INJECTION, SOLUTION INTRAVENOUS
Status: DISCONTINUED | OUTPATIENT
Start: 2025-05-21 | End: 2025-05-21 | Stop reason: HOSPADM

## 2025-05-21 RX ORDER — CEFAZOLIN SODIUM 1 G/3ML
INJECTION, POWDER, FOR SOLUTION INTRAMUSCULAR; INTRAVENOUS PRN
Status: DISCONTINUED | OUTPATIENT
Start: 2025-05-21 | End: 2025-05-21 | Stop reason: SURG

## 2025-05-21 RX ORDER — OXYCODONE AND ACETAMINOPHEN 5; 325 MG/1; MG/1
2 TABLET ORAL
Status: DISCONTINUED | OUTPATIENT
Start: 2025-05-21 | End: 2025-05-21 | Stop reason: HOSPADM

## 2025-05-21 RX ORDER — DIPHENHYDRAMINE HYDROCHLORIDE 50 MG/ML
12.5 INJECTION, SOLUTION INTRAMUSCULAR; INTRAVENOUS
Status: DISCONTINUED | OUTPATIENT
Start: 2025-05-21 | End: 2025-05-21 | Stop reason: HOSPADM

## 2025-05-21 RX ORDER — LABETALOL HYDROCHLORIDE 5 MG/ML
10 INJECTION, SOLUTION INTRAVENOUS
Status: COMPLETED | OUTPATIENT
Start: 2025-05-21 | End: 2025-05-21

## 2025-05-21 RX ORDER — DEXAMETHASONE SODIUM PHOSPHATE 4 MG/ML
INJECTION, SOLUTION INTRA-ARTICULAR; INTRALESIONAL; INTRAMUSCULAR; INTRAVENOUS; SOFT TISSUE PRN
Status: DISCONTINUED | OUTPATIENT
Start: 2025-05-21 | End: 2025-05-21 | Stop reason: SURG

## 2025-05-21 RX ORDER — LIDOCAINE HYDROCHLORIDE 20 MG/ML
INJECTION, SOLUTION EPIDURAL; INFILTRATION; INTRACAUDAL; PERINEURAL PRN
Status: DISCONTINUED | OUTPATIENT
Start: 2025-05-21 | End: 2025-05-21 | Stop reason: SURG

## 2025-05-21 RX ADMIN — SODIUM CHLORIDE, POTASSIUM CHLORIDE, SODIUM LACTATE AND CALCIUM CHLORIDE: 600; 310; 30; 20 INJECTION, SOLUTION INTRAVENOUS at 11:35

## 2025-05-21 RX ADMIN — PROPOFOL 200 MG: 10 INJECTION, EMULSION INTRAVENOUS at 12:46

## 2025-05-21 RX ADMIN — LABETALOL HYDROCHLORIDE 10 MG: 5 INJECTION, SOLUTION INTRAVENOUS at 11:35

## 2025-05-21 RX ADMIN — LIDOCAINE HYDROCHLORIDE 100 MG: 20 INJECTION, SOLUTION EPIDURAL; INFILTRATION; INTRACAUDAL; PERINEURAL at 12:46

## 2025-05-21 RX ADMIN — LABETALOL HYDROCHLORIDE 5 MG: 5 INJECTION, SOLUTION INTRAVENOUS at 13:30

## 2025-05-21 RX ADMIN — LABETALOL HYDROCHLORIDE 5 MG: 5 INJECTION, SOLUTION INTRAVENOUS at 13:39

## 2025-05-21 RX ADMIN — HYDRALAZINE HYDROCHLORIDE 5 MG: 20 INJECTION INTRAMUSCULAR; INTRAVENOUS at 13:50

## 2025-05-21 RX ADMIN — ONDANSETRON 4 MG: 2 INJECTION INTRAMUSCULAR; INTRAVENOUS at 13:08

## 2025-05-21 RX ADMIN — LABETALOL HYDROCHLORIDE 5 MG: 5 INJECTION, SOLUTION INTRAVENOUS at 12:54

## 2025-05-21 RX ADMIN — CEFAZOLIN 2 G: 1 INJECTION, POWDER, FOR SOLUTION INTRAMUSCULAR; INTRAVENOUS at 12:51

## 2025-05-21 RX ADMIN — DEXAMETHASONE SODIUM PHOSPHATE 8 MG: 4 INJECTION INTRA-ARTICULAR; INTRALESIONAL; INTRAMUSCULAR; INTRAVENOUS; SOFT TISSUE at 12:52

## 2025-05-21 RX ADMIN — FENTANYL CITRATE 100 MCG: 50 INJECTION, SOLUTION INTRAMUSCULAR; INTRAVENOUS at 12:42

## 2025-05-21 RX ADMIN — MIDAZOLAM HYDROCHLORIDE 2 MG: 1 INJECTION, SOLUTION INTRAMUSCULAR; INTRAVENOUS at 12:45

## 2025-05-21 RX ADMIN — LABETALOL HYDROCHLORIDE 5 MG: 5 INJECTION, SOLUTION INTRAVENOUS at 12:46

## 2025-05-21 RX ADMIN — FENTANYL CITRATE 100 MCG: 50 INJECTION, SOLUTION INTRAMUSCULAR; INTRAVENOUS at 13:01

## 2025-05-21 ASSESSMENT — ENCOUNTER SYMPTOMS
SYNCOPE: 0
COUGH: 0
PND: 0
PALPITATIONS: 0
IRREGULAR HEARTBEAT: 0
DIZZINESS: 0
NAUSEA: 0
SHORTNESS OF BREATH: 0
DYSPNEA ON EXERTION: 0
WEAKNESS: 0
FEVER: 0
VOMITING: 0
ORTHOPNEA: 0
DIARRHEA: 0
WHEEZING: 0
FOCAL WEAKNESS: 0
NEAR-SYNCOPE: 0
ABDOMINAL PAIN: 0
NIGHT SWEATS: 0

## 2025-05-21 ASSESSMENT — FIBROSIS 4 INDEX: FIB4 SCORE: 2.17

## 2025-05-21 ASSESSMENT — PAIN DESCRIPTION - PAIN TYPE
TYPE: SURGICAL PAIN
TYPE: SURGICAL PAIN
TYPE: ACUTE PAIN

## 2025-05-21 ASSESSMENT — PAIN SCALES - GENERAL: PAIN_LEVEL: 0

## 2025-05-21 NOTE — OR NURSING
1405 Received report from Ash SMITH.    1410 Patient arrived to phase 2. Patient alert and appropriate. VSS. Patient reporting tolerable pain level, denies any nausea. Patient moved from gurney to chair with steady gait.    1415 Patient able to void prior to dc.    1430 All discharge education given with support person at bedside. All inquiries addressed, no further questions at this time. PIV removed. All belongings gathered. Patient dressed at bedside. Patient in stable condition for discharge.     1437 Patient escorted to car via wheelchair with all belongings.

## 2025-05-21 NOTE — OR SURGEON
Immediate Post OP Note    PreOp Diagnosis: Urethral mass      PostOp Diagnosis: Same      Procedure(s):  TRANSURETHRAL RESECTION OF URETHRAL MASS    Surgeon(s):  Castro Burleson M.D.    Anesthesiologist/Type of Anesthesia:  Anesthesiologist: Avelino Daley M.D./* No anesthesia type entered *    Surgical Staff:  Circulator: Jose Ramon Rogel R.N.  Scrub Person: Wanda Kirkland    Specimens removed if any:  ID Type Source Tests Collected by Time Destination   A : urethral mass Other Other PATHOLOGY SPECIMEN Castro Burleson M.D. 5/21/2025  1:04 PM        Estimated Blood Loss: minimal    Findings: 1 cm urethral mass in distal bulbous urethra with small dorsal stalk; able to resect fully with cold cup forceps at base    Complications: None        5/21/2025 1:07 PM Castro Burleson M.D.

## 2025-05-21 NOTE — DISCHARGE INSTR - OTHER INFO
Postoperative Instructions    -Stay very well hydrated. You'll see some blood in the urine for a few days; be sure to drink enough water to help flush away this blood.  -Avoid heavy lifting or straining for about 1 week  -We will arrange for a follow up visit to assess how well you are emptying your bladder in about 2 weeks, and an office cystoscopy in about 6 months.   --Please call the [facility] office [phone] with any concerns prior to your visit.

## 2025-05-21 NOTE — OR NURSING
Patient allergies and NPO status verified, home medication reconciliation completed and belongings secured. Patient verbalizes understanding of pain scale and established a realistic pain goal. Expected course of stay and plan of care discussed. Surgical site verified, IV access established. VTE per provider preference in place. Medicated per mar. No further needs at this time.       Notified Dr Daley of bp 206/115 w/ hr 73 @11:10 - labetalol ordered. Patient responsive to med.

## 2025-05-21 NOTE — OP REPORT
Operative Report    Patient: Moise Cagle    MRN: [id number]    Operation: Cysoscopy and transurethral resection of urethral mass    Anesthesia: General    EBL: minimal mL    Surgeon: Castro Burleson MD    Assistant(s): n/a    Preoperative diagnosis: Recurrent urethral mass    Postoperative diagnosis: Same    Operative indications: Moise Cagle is a 69 y.o. male with a history of membranous urethral stenosis following radiotherapy for prostate cancer, and found at the time of planned DVIU and DCB dilation of the membranous stricture to also have two small urethral masses; those were removed in January 2025 and pathology positive for condylomata. Surveillance office cystoscopy demonstrated another urethral mass, this one about 1 cm in diameter. He has intermittent obstructive urinary symptoms and is here today for removal of the lesion.    Operative details: The patient was brought to the operating room and placed on the operating table in supine position. After administration of anesthesia, he was placed in the dorsal lithotomy position with all pressure points adequately padded, and then prepped and draped in usual sterile fashion. A surgical time-out was performed to confirm patient identity, diagnosis, operative plan including laterality, availability of all required equipment, and administration of proper antibiotic prophylaxis.     After the time-out, the case began by inserting a 22 [place] of [place] rigid cystoscope and carefully inspecting the urethra and the entirety of the bladder. Inspection revealed a normal fossa navicularis and pendulous urethra. In the distal bulbous urethra there was a large lesion that filled the entire urethral lumen, but with a narrow stalk near the dorsal midline.     Next, a cold cup forcep was used to grasp the mucosa surrounding the base of the stalk of the mass; this allowed for removal of the entirety of the mass, which was removed while withdrawing the cytsoscope.  The mass did break into two segments when transferring into a specimen cup.    The cystoscope was then replaced and the urethra inspected again. There was mild venous bleeding from the biopsy site. No other masses were visualized. The membranous urethra remained wide open. The prostatic fossa was also widely patent. The bladder was normal aside from some trabeculation, but there are no concerning mucosal abnormalities or tumors within the bladder.    Before removing the cystoscope the bladder was emptied. The patient was cleaned and dried and placed back into the supine position, and then woken from anesthesia.  The patient was then transferred to the recovery room in stable position.      Castro Burleson MD

## 2025-05-21 NOTE — OR NURSING
1317 Pt arrived to PACU from OR via gurney. Report received from anesthesia and RN. Respirations are spontaneous and unlabored. VSS on 6L. Dressing is CDI. Cold pack applied. OPA in place    1327 c/o 0/10 pain. OPA removed    1338 Family updated.    1400 Pt meets criteria for transfer to stage 2.

## 2025-05-21 NOTE — ANESTHESIA PREPROCEDURE EVALUATION
Case: [id number] Date/Time: 05/21/25 1245    Procedure: TRANSURETHRAL RESECTION OF URETHRAL MASS    Pre-op diagnosis: URETHRAL NEOPLASM    Location: [facility] OR 04 / SURGERY [facility]    Surgeons: Castro Burleson M.D.            Relevant Problems   CARDIAC   (positive) Essential hypertension, benign         (positive) Stage 3b chronic kidney disease      ENDO   (positive) Acquired hypothyroidism       Physical Exam    Airway   Mallampati: II  TM distance: >3 FB  Neck ROM: full       Cardiovascular - normal exam  Rhythm: regular  Rate: normal    (-) murmur     Dental - normal exam           Pulmonary - normal examBreath sounds clear to auscultation     Abdominal (+) obese     Neurological - normal exam         Other findings: Quit tobacco in 1990.  No MI or COPD.  Poorly controlled HTN.            Anesthesia Plan    ASA 3   ASA physical status 3 criteria: hypertension - poorly controlled    Plan - general       Airway plan will be LMA          Induction: intravenous    Postoperative Plan: Postoperative administration of opioids is intended.    Pertinent diagnostic labs and testing reviewed    Informed Consent:    Anesthetic plan and risks discussed with patient.    Use of blood products discussed with: patient whom consented to blood products.
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
## 2025-05-21 NOTE — ANESTHESIA PROCEDURE NOTES
Airway    Date/Time: 5/21/2025 12:48 PM    Performed by: Avelino Daley M.D.  Authorized by: Avelino Daley M.D.    Location:  OR  Urgency:  Elective  Difficult Airway: No    Indications for Airway Management:  Anesthesia      Spontaneous Ventilation: absent    Sedation Level:  Deep  Preoxygenated: Yes    Patient Position:  Sniffing  Mask Difficulty Assessment:  1 - vent by mask  Final Airway Type:  Supraglottic airway  Final Supraglottic Airway:  Standard LMA    SGA Size:  5  Number of Attempts at Approach:  1

## 2025-05-21 NOTE — ANESTHESIA TIME REPORT
Anesthesia Start and Stop Event Times       Date Time Event    5/21/2025 1238 Ready for Procedure     1240 Anesthesia Start     1320 Anesthesia Stop          Responsible Staff  05/21/25      Name Role Begin End    Avelino Daley M.D. Anesth 1240 1320          Overtime Reason:  no overtime (within assigned shift)    Comments:

## 2025-05-21 NOTE — ANESTHESIA POSTPROCEDURE EVALUATION
Patient: Moise Cagle    Procedure Summary       Date: 05/21/25 Room / Location:  OR  / SURGERY [facility]    Anesthesia Start: 1240 Anesthesia Stop: 1320    Procedure: TRANSURETHRAL RESECTION OF URETHRAL MASS (Penis) Diagnosis: (URETHRAL NEOPLASM)    Surgeons: Castro Burleson M.D. Responsible Provider: Avelino Daley M.D.    Anesthesia Type: general ASA Status: 3            Final Anesthesia Type: general  Last vitals  BP   Blood Pressure : (!) 177/93    Temp   37.2 °C (99 °F)    Pulse   62   Resp   12    SpO2   96 %      Anesthesia Post Evaluation    Patient location during evaluation: PACU  Patient participation: complete - patient participated  Level of consciousness: awake and alert  Pain score: 0    Airway patency: patent  Anesthetic complications: no  Cardiovascular status: hemodynamically stable  Respiratory status: acceptable  Hydration status: euvolemic    PONV: none          There were no known notable events for this encounter.     Nurse Pain Score: 7 (NPRS)

## 2025-05-21 NOTE — DISCHARGE INSTRUCTIONS
If any questions arise, call your provider.  If your provider is not available, please feel free to call the Surgical Center at [phone].    MEDICATIONS: Resume taking daily medication.  Take prescribed pain medication with food.  If no medication is prescribed, you may take non-aspirin pain medication if needed.  PAIN MEDICATION CAN BE VERY CONSTIPATING.  Take a stool softener or laxative such as senokot, pericolace, or milk of magnesia if needed.    Last pain medication given at     What to Expect Post Anesthesia    Rest and take it easy for the first 24 hours.  A responsible adult is recommended to remain with you during that time.  It is normal to feel sleepy.  We encourage you to not do anything that requires balance, judgment or coordination.    FOR 24 HOURS DO NOT:  Drive, operate machinery or run household appliances.  Drink beer or alcoholic beverages.  Make important decisions or sign legal documents.    To avoid nausea, slowly advance diet as tolerated, avoiding spicy or greasy foods for the first day.  Add more substantial food to your diet according to your provider's instructions.  Babies can be fed formula or breast milk as soon as they are hungry.  INCREASE FLUIDS AND FIBER TO AVOID CONSTIPATION.    MILD FLU-LIKE SYMPTOMS ARE NORMAL.  YOU MAY EXPERIENCE GENERALIZED MUSCLE ACHES, THROAT IRRITATION, HEADACHE AND/OR SOME NAUSEA.    Diet    Resume pre-operative diet upon discharge from the hospital. Depending on how you are feeling and whether you have nausea or not, you may wish to stay with a bland diet for the first few days. However, you can advance your diet as quickly as you feel ready.

## 2025-05-21 NOTE — PROGRESS NOTES
Cardiology Follow-up Consultation Note    Date of note:    05/22/25  Primary Care Provider: Debbie Mays P.A.-C.    Patient Name: Moise Cagle   YOB: 1955  MRN:              [id number]    Chief Complaint: Hypertension    History of Present Illness: Mr. Moise Cagle is a 69 y.o. male whose current medical problems include hypertension, hypothyroidism, and CKD who is here for follow-up hypertension.    The patient was last seen in my clinic on 3/21/2025.  The patient was initiated on carvedilol during the last visit. Echocardiogram was completed 3/2025, which showed mild LVH, grade 1 diastolic dysfunction, mild AI, dilated ascending aorta measuring 4.5 cm, otherwise unremarkable.  Due to elevated BP, the patient was started on hydralazine, which he did not tolerate.    The patient presents today for follow-up.   The patient reports that his blood pressure is better at home, although remains elevated 140s-150s systolic. He denies any chest pain or shortness of breath on exertion although does not exert much due to knee pain.  He denies any orthopnea, PND, or leg swelling.  No palpitations.  No syncope or presyncopal episodes.      Cardiovascular Risk Factors:  1. Smoking status: Former smoker  2. Type II Diabetes Mellitus: None/not recently checked  3. Hypertension: On medication  4. Dyslipidemia: Diet controlled  Cholesterol,Tot   Date Value Ref Range Status   10/09/2024 131 100 - 199 mg/dL Final     LDL   Date Value Ref Range Status   10/09/2024 70 <100 mg/dL Final     HDL   Date Value Ref Range Status   10/09/2024 28 (A) >=40 mg/dL Final     Triglycerides   Date Value Ref Range Status   10/09/2024 165 (H) 0 - 149 mg/dL Final     5. Family history of early Coronary Artery Disease in a first degree relative (Male less than 55 years of age; Female less than 65 years of age): None  6.  Obesity and/or Metabolic Syndrome: Body mass index is 35.15 kg/m².  7. Sedentary lifestyle: Not  active     Review of Systems   Constitutional: Negative for fever, malaise/fatigue and night sweats.   Cardiovascular:  Negative for chest pain, dyspnea on exertion, irregular heartbeat, leg swelling, near-syncope, orthopnea, palpitations, paroxysmal nocturnal dyspnea and syncope.   Respiratory:  Negative for cough, shortness of breath and wheezing.    Gastrointestinal:  Negative for abdominal pain, diarrhea, nausea and vomiting.   Neurological:  Negative for dizziness, focal weakness and weakness.         All other systems reviewed and are negative.       Current Outpatient Medications   Medication Sig Dispense Refill    NIFEdipine (ADALAT CC) 30 MG CR tablet Take 1 Tablet by mouth every day. 30 Tablet 11    terazosin (HYTRIN) 1 MG Cap Take 1 Capsule by mouth every evening. 90 Capsule 3    carvedilol (COREG) 25 MG Tab Take 1 Tablet by mouth 2 times a day with meals. 180 Tablet 3    hydroCHLOROthiazide 50 MG Tab Take 1 Tablet by mouth every day. 100 Tablet 3    lisinopril (PRINIVIL) 40 MG tablet Take 1 Tablet by mouth every morning. 100 Tablet 3    liothyronine (CYTOMEL) 5 MCG Tab TAKE 1 TABLET BY MOUTH EVERY  Tablet 3    cholestyramine light (PREVALITE) 4 GM/DOSE powder       allopurinol (ZYLOPRIM) 100 MG Tab Take 2 Tablets by mouth every day. 180 Tablet 3    levothyroxine (SYNTHROID) 200 MCG Tab Take 1 Tablet by mouth every morning on an empty stomach. 90 Tablet 3    ASCORBIC ACID PO Take  by mouth every day.      calcium citrate 315 mg-vitamin D 5 mcg 315-5 MG-MCG per tablet Take 1,200 mg by mouth every day.      MULTIPLE VITAMIN PO Take 1 Tablet by mouth every day.      Omega-3 Fatty Acids (FISH OIL) 1000 MG Cap capsule Take 1,000 mg by mouth every day.      Nutritional Supplements (NUTRITIONAL SUPPLEMENT PO) Take  by mouth. Heal n Soothe       No current facility-administered medications for this visit.         Allergies   Allergen Reactions    Amlodipine Unspecified     Swelling of feet and ankles     "Hydrocodone-Acetaminophen Unspecified     Teeth grinding anxious (vicodin); anxious      Morphine Unspecified     Hallucination       Hydralazine Swelling     Leg swelling     Other Environmental Runny Nose and Itching     Seasonal allergies       Physical Exam:  Ambulatory Vitals  BP (!) 154/82 (BP Location: Left arm, Patient Position: Sitting, BP Cuff Size: Adult)   Pulse 80   Resp 16   Ht 1.778 m (5' 10\")   Wt 111 kg (245 lb)   SpO2 95%    Oxygen Therapy:  Pulse Oximetry: 95 %  BP Readings from Last 4 Encounters:   05/22/25 (!) 154/82   05/21/25 (!) 182/101   03/21/25 (!) 168/98   03/17/25 (!) 162/90       Weight/BMI: Body mass index is 35.15 kg/m².  Wt Readings from Last 4 Encounters:   05/22/25 111 kg (245 lb)   05/21/25 110 kg (242 lb 15.2 oz)   04/29/25 107 kg (236 lb)   03/21/25 108 kg (238 lb)         General: Well appearing and in no apparent distress  Eyes: nl conjunctiva, no icteric sclera  ENT: normal external appearance of ears, nose, and throat  Neck: no visible JVP,  no carotid bruits  Lungs: normal respiratory effort, CTAB  Heart: RRR, no murmurs, no rubs or gallops,  no edema bilateral lower extremities. No LV/RV heave on cardiac palpatation. + bilateral radial pulses.  + bilateral dp pulses.   Abdomen: soft, non tender, non distended, no masses, normal bowel sounds.  No HSM.  Extremities/MSK: no clubbing, no cyanosis  Neurological: No focal sensory deficits  Psychiatric: Appropriate affect, A/O x 3, intact judgement and insight  Skin: Warm extremities      Lab Data Review:  Lab Results   Component Value Date/Time    CHOLSTRLTOT 131 10/09/2024 12:15 PM    LDL 70 10/09/2024 12:15 PM    HDL 28 (A) 10/09/2024 12:15 PM    TRIGLYCERIDE 165 (H) 10/09/2024 12:15 PM       Lab Results   Component Value Date/Time    SODIUM 137 05/12/2025 02:00 PM    POTASSIUM 4.9 05/12/2025 02:00 PM    CHLORIDE 109 05/12/2025 02:00 PM    CO2 20 05/12/2025 02:00 PM    GLUCOSE 93 05/12/2025 02:00 PM    BUN 38 (H) " "05/12/2025 02:00 PM    CREATININE 1.81 (H) 05/12/2025 02:00 PM     Lab Results   Component Value Date/Time    ALKPHOSPHAT 72 10/14/2024 12:36 PM    ASTSGOT 13 10/14/2024 12:36 PM    ALTSGPT 7 10/14/2024 12:36 PM    TBILIRUBIN 0.4 10/14/2024 12:36 PM      Lab Results   Component Value Date/Time    WBC 6.0 05/12/2025 02:00 PM    HEMOGLOBIN 12.3 (L) 05/12/2025 02:00 PM     No results found for: \"HBA1C\"      Cardiac Imaging and Procedures Review:    EKG dated 12/31/2024: My personal interpretation is sinus bradycardia    Echo dated 3/26/2025  CONCLUSIONS  Normal left ventricular systolic function. The left ventricular   ejection fraction is visually estimated to be 60-65%.   Mild concentric left ventricular hypertrophy.  Grade I diastolic   dysfunction.  Normal right ventricular size. Normal right ventricular systolic   function.  Mild aortic insufficiency.  The ascending aorta is dilated with a diameter of 4.5 cm.  No prior study is available for comparison.    Assessment & Plan     1. Essential hypertension        2. Chronic kidney disease, unspecified CKD stage        3. Mild aortic insufficiency        4. Aneurysm of ascending aorta without rupture (HCC)              Shared Medical Decision Making:    Hypertension  BP elevated this visit.  Home BP also elevated.  -Continue lisinopril 40mg daily  -Continue HCTZ 50mg daily  -Continue carvedilol 25mg twice daily  -Continue terazosin  -Start nifedipine 30mg daily  -Counseled the patient to measure BP at home. Goal is less than 130/80. If BP remains above 130/80 persistently, we will increase nifedipine  -Patient allergic to amlodipine and hydralazine.  -Counseled on heart healthy diet and low-salt diet.    CKD  -Continue to follow with nephrology    Mild AI  Ascending aortic aneurysm  Euvolemic on exam  -Manage BP as above  -Monitor via echocardiogram yearly    All of the patient's excellent questions were answered to the best of my knowledge and to  his satisfaction.  " It was a pleasure seeing Mr. Moise Cagle in my clinic today. Return in about 4 months (around 9/22/2025). Patient is aware to call the cardiology clinic with any questions or concerns.      Karine Liriano MD  [facility] Heart and Vascular [facility], [facility]  [address] [address]  Phone: [phone]  Fax: [phone]

## 2025-05-22 ENCOUNTER — OFFICE VISIT (OUTPATIENT)
Dept: CARDIOLOGY | Facility: MEDICAL CENTER | Age: 70
End: 2025-05-22
Attending: STUDENT IN AN ORGANIZED HEALTH CARE EDUCATION/TRAINING PROGRAM
Payer: MEDICARE

## 2025-05-22 VITALS
OXYGEN SATURATION: 95 % | RESPIRATION RATE: 16 BRPM | WEIGHT: 245 LBS | HEART RATE: 80 BPM | DIASTOLIC BLOOD PRESSURE: 82 MMHG | BODY MASS INDEX: 35.07 KG/M2 | SYSTOLIC BLOOD PRESSURE: 154 MMHG | HEIGHT: 70 IN

## 2025-05-22 DIAGNOSIS — I10 ESSENTIAL HYPERTENSION: Primary | ICD-10-CM

## 2025-05-22 DIAGNOSIS — I35.1 MILD AORTIC INSUFFICIENCY: ICD-10-CM

## 2025-05-22 DIAGNOSIS — I71.21 ANEURYSM OF ASCENDING AORTA WITHOUT RUPTURE (HCC): ICD-10-CM

## 2025-05-22 DIAGNOSIS — N18.9 CHRONIC KIDNEY DISEASE, UNSPECIFIED CKD STAGE: ICD-10-CM

## 2025-05-22 PROCEDURE — 3077F SYST BP >= 140 MM HG: CPT | Performed by: STUDENT IN AN ORGANIZED HEALTH CARE EDUCATION/TRAINING PROGRAM

## 2025-05-22 PROCEDURE — 3079F DIAST BP 80-89 MM HG: CPT | Performed by: STUDENT IN AN ORGANIZED HEALTH CARE EDUCATION/TRAINING PROGRAM

## 2025-05-22 PROCEDURE — 99214 OFFICE O/P EST MOD 30 MIN: CPT | Performed by: STUDENT IN AN ORGANIZED HEALTH CARE EDUCATION/TRAINING PROGRAM

## 2025-05-22 PROCEDURE — 99212 OFFICE O/P EST SF 10 MIN: CPT | Performed by: STUDENT IN AN ORGANIZED HEALTH CARE EDUCATION/TRAINING PROGRAM

## 2025-05-22 RX ORDER — NIFEDIPINE 30 MG
30 TABLET, EXTENDED RELEASE ORAL DAILY
Qty: 30 TABLET | Refills: 11 | Status: SHIPPED | OUTPATIENT
Start: 2025-05-22

## 2025-05-22 ASSESSMENT — FIBROSIS 4 INDEX: FIB4 SCORE: 2.17

## 2025-05-26 ENCOUNTER — PATIENT MESSAGE (OUTPATIENT)
Dept: UROLOGY | Facility: MEDICAL CENTER | Age: 70
End: 2025-05-26
Payer: MEDICARE

## 2025-05-27 ENCOUNTER — TELEPHONE (OUTPATIENT)
Dept: UROLOGY | Facility: MEDICAL CENTER | Age: 70
End: 2025-05-27
Payer: MEDICARE

## 2025-05-27 DIAGNOSIS — C61 PROSTATE CANCER (HCC): Primary | ICD-10-CM

## 2025-05-27 NOTE — TELEPHONE ENCOUNTER
I called Mr. Cagle today to discuss his recent pathology findings from resection of urethral mass last week; this returned positive for prostatic ductal carcinoma.    He was initially diagnosed with prostate cancer (biopsy pathology unavailable to review at this time, but he will try to obtain it from [facility]) in September 2017, and then treated with radiotherapy in early 2018 as well as 2 years of ADT.     We discussed updated staging, and given the atypical location for recurrence as well as the ductal carcinoma I will plan on presenting his case at our multidisciplinary tumor board as soon as we have an updated PSA and CT of the chest/abdomen/pelvis.

## 2025-05-31 ENCOUNTER — HOSPITAL ENCOUNTER (OUTPATIENT)
Dept: LAB | Facility: MEDICAL CENTER | Age: 70
End: 2025-05-31
Attending: UROLOGY
Payer: MEDICARE

## 2025-05-31 DIAGNOSIS — C61 PROSTATE CANCER (HCC): ICD-10-CM

## 2025-05-31 LAB — PSA SERPL DL<=0.01 NG/ML-MCNC: 0.28 NG/ML (ref 0–4)

## 2025-05-31 PROCEDURE — 36415 COLL VENOUS BLD VENIPUNCTURE: CPT

## 2025-05-31 PROCEDURE — 84153 ASSAY OF PSA TOTAL: CPT

## 2025-06-06 ENCOUNTER — HOSPITAL ENCOUNTER (OUTPATIENT)
Dept: RADIOLOGY | Facility: MEDICAL CENTER | Age: 70
End: 2025-06-06
Attending: UROLOGY
Payer: MEDICARE

## 2025-06-06 DIAGNOSIS — C61 PROSTATE CANCER (HCC): ICD-10-CM

## 2025-06-06 PROCEDURE — 71260 CT THORAX DX C+: CPT

## 2025-06-06 PROCEDURE — 700117 HCHG RX CONTRAST REV CODE 255: Performed by: UROLOGY

## 2025-06-06 RX ADMIN — IOHEXOL 100 ML: 350 INJECTION, SOLUTION INTRAVENOUS at 11:19

## 2025-06-16 ENCOUNTER — TELEPHONE (OUTPATIENT)
Dept: UROLOGY | Facility: MEDICAL CENTER | Age: 70
End: 2025-06-16
Payer: MEDICARE

## 2025-06-16 ENCOUNTER — PATIENT OUTREACH (OUTPATIENT)
Dept: ONCOLOGY | Facility: MEDICAL CENTER | Age: 70
End: 2025-06-16
Payer: MEDICARE

## 2025-06-16 DIAGNOSIS — C61 PROSTATE CANCER (HCC): Primary | ICD-10-CM

## 2025-06-16 NOTE — PROGRESS NOTES
First call placed to Luis this morning to introduce self and assist. Luis answers and speaks to ROSA. He is doing alright today. He is waiting for a call from Dr. Burleson to discuss his next steps. ROSA will continue to follow Luis and is available to help with care coordination.

## 2025-06-16 NOTE — TELEPHONE ENCOUNTER
I spoke with Mr. Cagle today about his case and our recent tumor board discussion about his case; the consensus of the group was that surgery is not recommended at this time (would require cystoprostatectomy and urethrectomy, and with such a low PSA and ability to do local surveillance this was deemed excessive), but that there may be benefit to adjuvant testing including next-gen sequencing of his recent tumor specimen as well as a referral to genetics. We'll also obtain an MRI of the pelvis/prostate, and I'll call him with those results. Finally, we'll continue with close cystoscopic surveillance, and schedule his next procedure for mid to late July. He appreciated the call and had no further questions.

## 2025-06-25 NOTE — Clinical Note
REFERRAL APPROVAL NOTICE         Sent on June 25, 2025                   Luis Cagle  [address] [place] [address]                   Dear Mr. Cagle,    After a careful review of the medical information and benefit coverage, [facility] has processed your referral. See below for additional details.    If applicable, you must be actively enrolled with your insurance for coverage of the authorized service. If you have any questions regarding your coverage, please contact your insurance directly.    REFERRAL INFORMATION   Referral #:  [id number]  Referred-To Department    Referred-By Provider:  Urology    Castro Burleson M.D.   [facility] Urology      [address]  [place] [address] [phone] [address]  [place] [id number] [phone]    Referral Start Date:  06/25/2025  Referral End Date:   06/25/2027             SCHEDULING  If you do not already have an appointment, please call [phone] to make an appointment.     MORE INFORMATION  If you do not already have a [facility] account, sign up at: [email or web address]  You can access your medical information, make appointments, see lab results, billing information, and more.  If you have questions regarding this referral, please contact  the [facility] Referrals department at:             [phone]. Monday - Friday 8:00AM - 5:00PM.     Sincerely,    [facility]

## 2025-06-26 ENCOUNTER — HOSPITAL ENCOUNTER (OUTPATIENT)
Dept: LAB | Facility: MEDICAL CENTER | Age: 70
End: 2025-06-26
Attending: PHYSICIAN ASSISTANT
Payer: MEDICARE

## 2025-06-26 ENCOUNTER — OFFICE VISIT (OUTPATIENT)
Dept: MEDICAL GROUP | Age: 70
End: 2025-06-26
Payer: MEDICARE

## 2025-06-26 VITALS
HEART RATE: 76 BPM | BODY MASS INDEX: 36.51 KG/M2 | OXYGEN SATURATION: 94 % | SYSTOLIC BLOOD PRESSURE: 140 MMHG | TEMPERATURE: 99.7 F | HEIGHT: 70 IN | DIASTOLIC BLOOD PRESSURE: 70 MMHG | WEIGHT: 255 LBS

## 2025-06-26 DIAGNOSIS — R53.83 OTHER FATIGUE: ICD-10-CM

## 2025-06-26 DIAGNOSIS — L30.9 DERMATITIS: ICD-10-CM

## 2025-06-26 LAB
25(OH)D3 SERPL-MCNC: 36 NG/ML (ref 30–100)
BASOPHILS # BLD AUTO: 0.4 % (ref 0–1.8)
BASOPHILS # BLD: 0.03 K/UL (ref 0–0.12)
EOSINOPHIL # BLD AUTO: 0.66 K/UL (ref 0–0.51)
EOSINOPHIL NFR BLD: 8 % (ref 0–6.9)
ERYTHROCYTE [DISTWIDTH] IN BLOOD BY AUTOMATED COUNT: 52.9 FL (ref 35.9–50)
HCT VFR BLD AUTO: 38.5 % (ref 42–52)
HGB BLD-MCNC: 12.6 G/DL (ref 14–18)
IMM GRANULOCYTES # BLD AUTO: 0.06 K/UL (ref 0–0.11)
IMM GRANULOCYTES NFR BLD AUTO: 0.7 % (ref 0–0.9)
LYMPHOCYTES # BLD AUTO: 1.06 K/UL (ref 1–4.8)
LYMPHOCYTES NFR BLD: 12.8 % (ref 22–41)
MCH RBC QN AUTO: 32.6 PG (ref 27–33)
MCHC RBC AUTO-ENTMCNC: 32.7 G/DL (ref 32.3–36.5)
MCV RBC AUTO: 99.5 FL (ref 81.4–97.8)
MONOCYTES # BLD AUTO: 1.06 K/UL (ref 0–0.85)
MONOCYTES NFR BLD AUTO: 12.8 % (ref 0–13.4)
NEUTROPHILS # BLD AUTO: 5.38 K/UL (ref 1.82–7.42)
NEUTROPHILS NFR BLD: 65.3 % (ref 44–72)
NRBC # BLD AUTO: 0 K/UL
NRBC BLD-RTO: 0 /100 WBC (ref 0–0.2)
NT-PROBNP SERPL IA-MCNC: 247 PG/ML (ref 0–125)
PLATELET # BLD AUTO: 194 K/UL (ref 164–446)
PMV BLD AUTO: 11.8 FL (ref 9–12.9)
RBC # BLD AUTO: 3.87 M/UL (ref 4.7–6.1)
TESTOST SERPL-MCNC: 169 NG/DL (ref 175–781)
TSH SERPL DL<=0.005 MIU/L-ACNC: 0.77 UIU/ML (ref 0.38–5.33)
WBC # BLD AUTO: 8.3 K/UL (ref 4.8–10.8)

## 2025-06-26 PROCEDURE — 99214 OFFICE O/P EST MOD 30 MIN: CPT | Performed by: PHYSICIAN ASSISTANT

## 2025-06-26 PROCEDURE — 84403 ASSAY OF TOTAL TESTOSTERONE: CPT

## 2025-06-26 PROCEDURE — 3077F SYST BP >= 140 MM HG: CPT | Performed by: PHYSICIAN ASSISTANT

## 2025-06-26 PROCEDURE — 83880 ASSAY OF NATRIURETIC PEPTIDE: CPT | Mod: GA

## 2025-06-26 PROCEDURE — 3078F DIAST BP <80 MM HG: CPT | Performed by: PHYSICIAN ASSISTANT

## 2025-06-26 PROCEDURE — 36415 COLL VENOUS BLD VENIPUNCTURE: CPT

## 2025-06-26 PROCEDURE — 82306 VITAMIN D 25 HYDROXY: CPT | Mod: GA

## 2025-06-26 PROCEDURE — 84443 ASSAY THYROID STIM HORMONE: CPT

## 2025-06-26 PROCEDURE — 85025 COMPLETE CBC W/AUTO DIFF WBC: CPT

## 2025-06-26 RX ORDER — METHYLPREDNISOLONE SODIUM SUCCINATE 125 MG/2ML
125 INJECTION, POWDER, LYOPHILIZED, FOR SOLUTION INTRAMUSCULAR; INTRAVENOUS ONCE
Status: COMPLETED | OUTPATIENT
Start: 2025-06-26 | End: 2025-06-26

## 2025-06-26 RX ADMIN — METHYLPREDNISOLONE SODIUM SUCCINATE 125 MG: 125 INJECTION, POWDER, LYOPHILIZED, FOR SOLUTION INTRAMUSCULAR; INTRAVENOUS at 14:05

## 2025-06-26 ASSESSMENT — FIBROSIS 4 INDEX: FIB4 SCORE: 2.17

## 2025-06-26 NOTE — PROGRESS NOTES
"cc: Rash    Subjective:     HPI    History of Present Illness  The patient is a 68-year-old male presenting with concerns of a rash that onset 4 days ago.    He reports the sudden appearance of a bright red rash upon waking on Monday morning, which has since spread extensively across his body, sparing only his face, palms, and feet. The most severe manifestations are on his knees and thighs, with minor involvement of his shoulders. He reports no new dietary changes or use of new detergents, soaps, or lotions. . He reports no shortness of breath or wheezing but does report fatigue. He has been managing the itchiness with Benadryl and cortisone cream. He has been using the same detergent for the past year and spends most of his time at home. He was prescribed nifedipine by his cardiologist a month ago, which has effectively lowered his blood pressure. He reports no recent spread of the rash.    He has a history of anemia, with his last hemoglobin and hematocrit levels being lower than previous readings. He consulted a hematologist prior to the first of the year, who discussed various types of anemia with him, but no follow-up was scheduled. He reports feeling weaker than usual and struggling more than in the previous year.        Review of systems:  See above.     Current Medications[1]    Allergies, past medical history, past surgical history, family history, social history reviewed and updated    Objective:     Vitals: BP (!) 140/70   Pulse 76   Temp 37.6 °C (99.7 °F)   Ht 1.778 m (5' 10\")   Wt 116 kg (255 lb)   SpO2 94%   BMI 36.59 kg/m²   General: Alert, pleasant, NAD  HEENT: Normocephalic. Neck supple.  No thyromegaly or masses palpated. No cervical or supraclavicular lymphadenopathy. No carotid bruits   Heart: Regular rate and rhythm.  S1 and S2 normal.  No murmurs appreciated.  Respiratory: Normal respiratory effort.  Clear to auscultation bilaterally.  Skin: Erythematous papular rash on the trunk upper " arms, in lower extremities sparing hands face and feet.  No vesicles, no streaking erythema or edema  Psych:  Affect/mood is normal, judgement is good, memory is intact, grooming is appropriate.    Assessment/Plan:     Luis was seen today for rash.    Diagnoses and all orders for this visit:    Dermatitis  -Has extensive rash, only sparing forearms, hands, feet, face.  Is in the distribution of clothing, he does not note any new detergent.  Potentially could still be a dermatitis to some sort of material.  Also did start nifedipine approximately 4 weeks ago could be drug related reaction if symptoms do not improve after the Solu-Medrol would advise to DC nifedipine.  And follow-up with cardiology in regards to blood pressure management.  Continue with Benadryl, Claritin, can also take Pepcid.  If any point this worsens starts to experience wheezing or shortness of breath needs to go the ER immediately  -     methylPREDNISolone sod succ (Solu-Medrol) 125 MG injection 125 mg  -     methylPREDNISolone sod succ (Solu-Medrol) 125 MG injection 125 mg    Other fatigue  -Progressively worsening/chronic.  Will check below labs.  Further treatment if needed pending results  -     TESTOSTERONE SERUM; Future  -     TSH WITH REFLEX TO FT4; Future  -     VITAMIN D,25 HYDROXY (DEFICIENCY); Future  -     CBC WITH DIFFERENTIAL; Future  -     proBrain Natriuretic Peptide, NT; Future        Return if symptoms worsen or fail to improve.       [1]   Current Outpatient Medications:     NIFEdipine (ADALAT CC) 30 MG CR tablet, Take 1 Tablet by mouth every day., Disp: 30 Tablet, Rfl: 11    terazosin (HYTRIN) 1 MG Cap, Take 1 Capsule by mouth every evening., Disp: 90 Capsule, Rfl: 3    carvedilol (COREG) 25 MG Tab, Take 1 Tablet by mouth 2 times a day with meals., Disp: 180 Tablet, Rfl: 3    hydroCHLOROthiazide 50 MG Tab, Take 1 Tablet by mouth every day., Disp: 100 Tablet, Rfl: 3    lisinopril (PRINIVIL) 40 MG tablet, Take 1 Tablet by mouth  every morning., Disp: 100 Tablet, Rfl: 3    liothyronine (CYTOMEL) 5 MCG Tab, TAKE 1 TABLET BY MOUTH EVERY DAY, Disp: 100 Tablet, Rfl: 3    cholestyramine light (PREVALITE) 4 GM/DOSE powder, , Disp: , Rfl:     allopurinol (ZYLOPRIM) 100 MG Tab, Take 2 Tablets by mouth every day., Disp: 180 Tablet, Rfl: 3    levothyroxine (SYNTHROID) 200 MCG Tab, Take 1 Tablet by mouth every morning on an empty stomach., Disp: 90 Tablet, Rfl: 3    ASCORBIC ACID PO, Take  by mouth every day., Disp: , Rfl:     calcium citrate 315 mg-vitamin D 5 mcg 315-5 MG-MCG per tablet, Take 1,200 mg by mouth every day., Disp: , Rfl:     MULTIPLE VITAMIN PO, Take 1 Tablet by mouth every day., Disp: , Rfl:     Omega-3 Fatty Acids (FISH OIL) 1000 MG Cap capsule, Take 1,000 mg by mouth every day., Disp: , Rfl:     Nutritional Supplements (NUTRITIONAL SUPPLEMENT PO), Take  by mouth. Heal n Soothe, Disp: , Rfl:

## 2025-06-27 ENCOUNTER — RESULTS FOLLOW-UP (OUTPATIENT)
Dept: MEDICAL GROUP | Age: 70
End: 2025-06-27

## 2025-07-08 ENCOUNTER — PROCEDURE VISIT (OUTPATIENT)
Dept: UROLOGY | Facility: MEDICAL CENTER | Age: 70
End: 2025-07-08
Payer: MEDICARE

## 2025-07-08 DIAGNOSIS — R31.0 GROSS HEMATURIA: Primary | ICD-10-CM

## 2025-07-08 DIAGNOSIS — C61 PROSTATE CANCER (HCC): ICD-10-CM

## 2025-07-08 DIAGNOSIS — C68.0: ICD-10-CM

## 2025-07-08 LAB
APPEARANCE UR: CLEAR
BILIRUB UR STRIP-MCNC: NORMAL MG/DL
COLOR UR AUTO: YELLOW
GLUCOSE UR STRIP.AUTO-MCNC: NORMAL MG/DL
KETONES UR STRIP.AUTO-MCNC: NORMAL MG/DL
LEUKOCYTE ESTERASE UR QL STRIP.AUTO: NORMAL
NITRITE UR QL STRIP.AUTO: NORMAL
PH UR STRIP.AUTO: 5.5 [PH] (ref 5–8)
PROT UR QL STRIP: NORMAL MG/DL
RBC UR QL AUTO: NORMAL
SP GR UR STRIP.AUTO: 1.01
UROBILINOGEN UR STRIP-MCNC: 0.2 MG/DL

## 2025-07-08 PROCEDURE — 81002 URINALYSIS NONAUTO W/O SCOPE: CPT | Performed by: UROLOGY

## 2025-07-08 PROCEDURE — 52000 CYSTOURETHROSCOPY: CPT | Performed by: UROLOGY

## 2025-07-08 NOTE — PROGRESS NOTES
Flexible Cystoscopy Report    Patient name: Moise Cagle    Age: 69 y.o.    Procedure: Flexible cystourethroscopy    Pre-procedure diagnosis: Prostatic ductal carcinoma     Post-procedure diagnosis: Same    Procedure indications: Moise Cagle is a 69 y.o. male with a history of high risk prostate cancer treated previously with XRT and ADT, and found more recently after treatment of a membranous urethral stricture to have urethral polyps; pathology after removal consistent with prostatic ductal carcinoma. Here today for surveillance.     Procedure details: After providing a urine sample for a urinalysis to rule out active urinary tract infection, the patient was brought to the procedure room and placed in supine position. The external genitalia were then prepped and draped in usual sterile fashion. Lidocaine jelly was administered via the urethral meatus and held in place for approximately two minutes for local anesthesia.    After a procedure time-out to confirm the proper patient, procedure, consent, and availability of all necessary equipment, the case began by inserting a 16-Vietnamese flexible cystoscope via the urethral meatus. Findings included:    Anterior urethra: normal aside from scar from most recent urethral tumor resection. No new lesions or mucosal abnormalities.   Posterior urethra: Membranous urethra remains patent, and scope passes easily. Prostatic urethra normal without mucosal abnormalities or polyps.   Bladder:  Bladder mucosa normal without masses, diverticuli, or trabeculations. No stones or other foreign bodies noted. Bilateral ureteral orifices were identified and found to have efflux of clear yellow urine.     The cystoscope was then carefully withdrawn from the bladder and urethra. The patient was cleaned and dried and allowed to void.     Assessment/Plan:  No evidence of recurrent disease.   Scheduled for MRI pelvis later this month; I'll call with the results  If no  concerning findings, will continue with cysto surveillance q3 months for now with continued PSA surveillance and interval imaging early next year.     Castro Burleson MD

## 2025-07-09 DIAGNOSIS — I10 ESSENTIAL HYPERTENSION: ICD-10-CM

## 2025-07-09 NOTE — TELEPHONE ENCOUNTER
Is the patient due for a refill? Yes    Was the patient seen the last 15 months? Yes    Date of last office visit: 5/22/25    Does the patient have an upcoming appointment?  Yes   If yes, When? 9/23/25    Provider to refill: HK    Does the patient have FDC Plus and need 100-day supply? (This applies to ALL medications) Patient does not have SCP      Patient had chest pain last night. EKG ant ischemia , Better with ntg. Would transfuse. Will begin ranexa. May need consider cardiac cath. Prognosis guarded

## 2025-07-10 RX ORDER — HYDRALAZINE HYDROCHLORIDE 25 MG/1
25 TABLET, FILM COATED ORAL 2 TIMES DAILY
Qty: 180 TABLET | Refills: 0 | OUTPATIENT
Start: 2025-07-10

## 2025-07-10 NOTE — TELEPHONE ENCOUNTER
Last OV with HK on 5/22/25. Refill denied, medication was discontinued due to pt allergy per HK last OV note.

## 2025-07-19 SDOH — ECONOMIC STABILITY: INCOME INSECURITY: HOW HARD IS IT FOR YOU TO PAY FOR THE VERY BASICS LIKE FOOD, HOUSING, MEDICAL CARE, AND HEATING?: NOT VERY HARD

## 2025-07-19 SDOH — ECONOMIC STABILITY: TRANSPORTATION INSECURITY
IN THE PAST 12 MONTHS, HAS THE LACK OF TRANSPORTATION KEPT YOU FROM MEDICAL APPOINTMENTS OR FROM GETTING MEDICATIONS?: NO

## 2025-07-19 SDOH — HEALTH STABILITY: PHYSICAL HEALTH: ON AVERAGE, HOW MANY DAYS PER WEEK DO YOU ENGAGE IN MODERATE TO STRENUOUS EXERCISE (LIKE A BRISK WALK)?: 0 DAYS

## 2025-07-19 SDOH — ECONOMIC STABILITY: INCOME INSECURITY: IN THE LAST 12 MONTHS, WAS THERE A TIME WHEN YOU WERE NOT ABLE TO PAY THE MORTGAGE OR RENT ON TIME?: NO

## 2025-07-19 SDOH — ECONOMIC STABILITY: HOUSING INSECURITY
IN THE LAST 12 MONTHS, WAS THERE A TIME WHEN YOU DID NOT HAVE A STEADY PLACE TO SLEEP OR SLEPT IN A SHELTER (INCLUDING NOW)?: NO

## 2025-07-19 SDOH — ECONOMIC STABILITY: TRANSPORTATION INSECURITY
IN THE PAST 12 MONTHS, HAS LACK OF RELIABLE TRANSPORTATION KEPT YOU FROM MEDICAL APPOINTMENTS, MEETINGS, WORK OR FROM GETTING THINGS NEEDED FOR DAILY LIVING?: NO

## 2025-07-19 SDOH — HEALTH STABILITY: PHYSICAL HEALTH: ON AVERAGE, HOW MANY MINUTES DO YOU ENGAGE IN EXERCISE AT THIS LEVEL?: 0 MIN

## 2025-07-19 SDOH — ECONOMIC STABILITY: FOOD INSECURITY: WITHIN THE PAST 12 MONTHS, YOU WORRIED THAT YOUR FOOD WOULD RUN OUT BEFORE YOU GOT MONEY TO BUY MORE.: NEVER TRUE

## 2025-07-19 SDOH — ECONOMIC STABILITY: FOOD INSECURITY: WITHIN THE PAST 12 MONTHS, THE FOOD YOU BOUGHT JUST DIDN'T LAST AND YOU DIDN'T HAVE MONEY TO GET MORE.: NEVER TRUE

## 2025-07-19 SDOH — HEALTH STABILITY: MENTAL HEALTH
STRESS IS WHEN SOMEONE FEELS TENSE, NERVOUS, ANXIOUS, OR CAN'T SLEEP AT NIGHT BECAUSE THEIR MIND IS TROUBLED. HOW STRESSED ARE YOU?: NOT AT ALL

## 2025-07-19 SDOH — ECONOMIC STABILITY: TRANSPORTATION INSECURITY
IN THE PAST 12 MONTHS, HAS LACK OF TRANSPORTATION KEPT YOU FROM MEETINGS, WORK, OR FROM GETTING THINGS NEEDED FOR DAILY LIVING?: NO

## 2025-07-19 ASSESSMENT — LIFESTYLE VARIABLES
AUDIT-C TOTAL SCORE: 0
SKIP TO QUESTIONS 9-10: 1
HOW MANY STANDARD DRINKS CONTAINING ALCOHOL DO YOU HAVE ON A TYPICAL DAY: PATIENT DOES NOT DRINK
HOW OFTEN DO YOU HAVE SIX OR MORE DRINKS ON ONE OCCASION: NEVER
HOW OFTEN DO YOU HAVE A DRINK CONTAINING ALCOHOL: NEVER

## 2025-07-19 ASSESSMENT — SOCIAL DETERMINANTS OF HEALTH (SDOH)
HOW OFTEN DO YOU HAVE A DRINK CONTAINING ALCOHOL: NEVER
IN THE PAST 12 MONTHS, HAS THE ELECTRIC, GAS, OIL, OR WATER COMPANY THREATENED TO SHUT OFF SERVICE IN YOUR HOME?: NO
IN A TYPICAL WEEK, HOW MANY TIMES DO YOU TALK ON THE PHONE WITH FAMILY, FRIENDS, OR NEIGHBORS?: ONCE A WEEK
HOW HARD IS IT FOR YOU TO PAY FOR THE VERY BASICS LIKE FOOD, HOUSING, MEDICAL CARE, AND HEATING?: NOT VERY HARD
HOW OFTEN DO YOU ATTEND CHURCH OR RELIGIOUS SERVICES?: NEVER
HOW MANY DRINKS CONTAINING ALCOHOL DO YOU HAVE ON A TYPICAL DAY WHEN YOU ARE DRINKING: PATIENT DOES NOT DRINK
HOW OFTEN DO YOU GET TOGETHER WITH FRIENDS OR RELATIVES?: ONCE A WEEK
HOW OFTEN DO YOU HAVE SIX OR MORE DRINKS ON ONE OCCASION: NEVER
HOW OFTEN DO YOU ATTENT MEETINGS OF THE CLUB OR ORGANIZATION YOU BELONG TO?: NEVER
HOW OFTEN DO YOU GET TOGETHER WITH FRIENDS OR RELATIVES?: ONCE A WEEK
HOW OFTEN DO YOU ATTENT MEETINGS OF THE CLUB OR ORGANIZATION YOU BELONG TO?: NEVER
WITHIN THE PAST 12 MONTHS, YOU WORRIED THAT YOUR FOOD WOULD RUN OUT BEFORE YOU GOT THE MONEY TO BUY MORE: NEVER TRUE
IN A TYPICAL WEEK, HOW MANY TIMES DO YOU TALK ON THE PHONE WITH FAMILY, FRIENDS, OR NEIGHBORS?: ONCE A WEEK
DO YOU BELONG TO ANY CLUBS OR ORGANIZATIONS SUCH AS CHURCH GROUPS UNIONS, FRATERNAL OR ATHLETIC GROUPS, OR SCHOOL GROUPS?: NO
HOW OFTEN DO YOU ATTEND CHURCH OR RELIGIOUS SERVICES?: NEVER
DO YOU BELONG TO ANY CLUBS OR ORGANIZATIONS SUCH AS CHURCH GROUPS UNIONS, FRATERNAL OR ATHLETIC GROUPS, OR SCHOOL GROUPS?: NO

## 2025-07-22 ENCOUNTER — OFFICE VISIT (OUTPATIENT)
Dept: MEDICAL GROUP | Age: 70
End: 2025-07-22
Payer: MEDICARE

## 2025-07-22 VITALS
HEART RATE: 74 BPM | SYSTOLIC BLOOD PRESSURE: 130 MMHG | BODY MASS INDEX: 36.79 KG/M2 | OXYGEN SATURATION: 98 % | HEIGHT: 70 IN | TEMPERATURE: 97 F | WEIGHT: 257 LBS | DIASTOLIC BLOOD PRESSURE: 74 MMHG

## 2025-07-22 DIAGNOSIS — Z85.46 HISTORY OF PROSTATE CANCER: ICD-10-CM

## 2025-07-22 DIAGNOSIS — E29.1 HYPOGONADISM IN MALE: ICD-10-CM

## 2025-07-22 DIAGNOSIS — D64.9 ANEMIA, UNSPECIFIED TYPE: ICD-10-CM

## 2025-07-22 DIAGNOSIS — R53.83 OTHER FATIGUE: ICD-10-CM

## 2025-07-22 DIAGNOSIS — N13.9 OBSTRUCTED, UROPATHY: ICD-10-CM

## 2025-07-22 DIAGNOSIS — Z00.00 MEDICARE ANNUAL WELLNESS VISIT, SUBSEQUENT: Primary | ICD-10-CM

## 2025-07-22 DIAGNOSIS — I10 ESSENTIAL HYPERTENSION, BENIGN: ICD-10-CM

## 2025-07-22 DIAGNOSIS — N18.32 STAGE 3B CHRONIC KIDNEY DISEASE: ICD-10-CM

## 2025-07-22 DIAGNOSIS — E55.9 VITAMIN D DEFICIENCY: ICD-10-CM

## 2025-07-22 DIAGNOSIS — E03.9 ACQUIRED HYPOTHYROIDISM: ICD-10-CM

## 2025-07-22 DIAGNOSIS — I87.2 VENOUS STASIS DERMATITIS: ICD-10-CM

## 2025-07-22 DIAGNOSIS — M10.9 GOUT, UNSPECIFIED CAUSE, UNSPECIFIED CHRONICITY, UNSPECIFIED SITE: ICD-10-CM

## 2025-07-22 DIAGNOSIS — R60.0 BILATERAL LOWER EXTREMITY EDEMA: ICD-10-CM

## 2025-07-22 RX ORDER — POTASSIUM CHLORIDE 750 MG/1
10 TABLET, EXTENDED RELEASE ORAL 2 TIMES DAILY
Qty: 60 TABLET | Refills: 3 | Status: SHIPPED | OUTPATIENT
Start: 2025-07-22

## 2025-07-22 RX ORDER — FUROSEMIDE 20 MG/1
20 TABLET ORAL 2 TIMES DAILY
Qty: 60 TABLET | Refills: 1 | Status: SHIPPED | OUTPATIENT
Start: 2025-07-22

## 2025-07-22 ASSESSMENT — ACTIVITIES OF DAILY LIVING (ADL): BATHING_REQUIRES_ASSISTANCE: 0

## 2025-07-22 ASSESSMENT — FIBROSIS 4 INDEX: FIB4 SCORE: 1.8

## 2025-07-22 ASSESSMENT — ENCOUNTER SYMPTOMS: GENERAL WELL-BEING: POOR

## 2025-07-22 ASSESSMENT — PATIENT HEALTH QUESTIONNAIRE - PHQ9: CLINICAL INTERPRETATION OF PHQ2 SCORE: 0
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
## 2025-07-24 ENCOUNTER — APPOINTMENT (OUTPATIENT)
Dept: RADIOLOGY | Facility: MEDICAL CENTER | Age: 70
End: 2025-07-24
Attending: UROLOGY
Payer: MEDICARE

## 2025-07-24 DIAGNOSIS — C61 PROSTATE CANCER (HCC): ICD-10-CM

## 2025-07-24 PROCEDURE — 700117 HCHG RX CONTRAST REV CODE 255: Mod: JZ | Performed by: UROLOGY

## 2025-07-24 PROCEDURE — A9579 GAD-BASE MR CONTRAST NOS,1ML: HCPCS | Mod: JZ | Performed by: UROLOGY

## 2025-07-24 PROCEDURE — 72197 MRI PELVIS W/O & W/DYE: CPT

## 2025-07-24 PROCEDURE — 700111 HCHG RX REV CODE 636 W/ 250 OVERRIDE (IP): Mod: JZ,TB | Performed by: RADIOLOGY

## 2025-07-24 RX ORDER — GADOTERIDOL 279.3 MG/ML
20 INJECTION INTRAVENOUS ONCE
Status: COMPLETED | OUTPATIENT
Start: 2025-07-24 | End: 2025-07-24

## 2025-07-24 RX ADMIN — GLUCAGON 1 MG: KIT at 11:08

## 2025-07-24 RX ADMIN — GADOTERIDOL 20 ML: 279.3 INJECTION, SOLUTION INTRAVENOUS at 12:00

## 2025-07-25 ENCOUNTER — TELEPHONE (OUTPATIENT)
Dept: UROLOGY | Facility: MEDICAL CENTER | Age: 70
End: 2025-07-25
Payer: MEDICARE

## 2025-07-25 DIAGNOSIS — Z85.46 HISTORY OF PROSTATE CANCER: Primary | ICD-10-CM

## 2025-07-25 NOTE — TELEPHONE ENCOUNTER
I called Mr. Cagle today to discuss his MRI from yesterday; there are no pelvic masses, no clear lesions in the portion of the urethra imaged, and no pelvic adenopathy. Likewise, no particular lesions within the prostate. Reassured him that this was all good news.   We'll repeat a PSA before his next cystoscopy in October.

## 2025-07-28 ENCOUNTER — OFFICE VISIT (OUTPATIENT)
Dept: NEPHROLOGY | Facility: MEDICAL CENTER | Age: 70
End: 2025-07-28
Payer: MEDICARE

## 2025-07-28 VITALS
WEIGHT: 251.9 LBS | TEMPERATURE: 99.8 F | BODY MASS INDEX: 36.06 KG/M2 | HEIGHT: 70 IN | SYSTOLIC BLOOD PRESSURE: 126 MMHG | OXYGEN SATURATION: 95 % | DIASTOLIC BLOOD PRESSURE: 64 MMHG | HEART RATE: 76 BPM

## 2025-07-28 DIAGNOSIS — I10 ESSENTIAL HYPERTENSION, BENIGN: ICD-10-CM

## 2025-07-28 DIAGNOSIS — N20.0 NEPHROLITHIASIS: ICD-10-CM

## 2025-07-28 DIAGNOSIS — R60.9 EDEMA, UNSPECIFIED TYPE: ICD-10-CM

## 2025-07-28 DIAGNOSIS — N26.1 ATROPHIC KIDNEY: ICD-10-CM

## 2025-07-28 DIAGNOSIS — N13.9 OBSTRUCTIVE UROPATHY: ICD-10-CM

## 2025-07-28 DIAGNOSIS — N18.32 STAGE 3B CHRONIC KIDNEY DISEASE: Primary | ICD-10-CM

## 2025-07-28 PROCEDURE — 3078F DIAST BP <80 MM HG: CPT | Performed by: INTERNAL MEDICINE

## 2025-07-28 PROCEDURE — 99214 OFFICE O/P EST MOD 30 MIN: CPT | Performed by: INTERNAL MEDICINE

## 2025-07-28 PROCEDURE — G2211 COMPLEX E/M VISIT ADD ON: HCPCS | Performed by: INTERNAL MEDICINE

## 2025-07-28 PROCEDURE — 3074F SYST BP LT 130 MM HG: CPT | Performed by: INTERNAL MEDICINE

## 2025-07-28 ASSESSMENT — FIBROSIS 4 INDEX: FIB4 SCORE: 1.8

## 2025-07-28 ASSESSMENT — ENCOUNTER SYMPTOMS
FEVER: 0
CHILLS: 0
HYPERTENSION: 1
SHORTNESS OF BREATH: 0
COUGH: 0
NAUSEA: 0
VOMITING: 0

## 2025-07-28 NOTE — PROGRESS NOTES
"Subjective     Luis Cagle is a 69 y.o. male who presents with Chronic Kidney Disease and Hypertension            Chronic Kidney Disease  This is a chronic problem. The current episode started more than 1 year ago. The problem occurs constantly. The problem has been unchanged. Pertinent negatives include no chest pain, chills, coughing, fever, nausea, urinary symptoms or vomiting.   Hypertension  This is a chronic problem. The current episode started more than 1 year ago. The problem is unchanged. The problem is controlled. Pertinent negatives include no chest pain, malaise/fatigue, peripheral edema or shortness of breath. Risk factors for coronary artery disease include male gender and obesity. Past treatments include ACE inhibitors. The current treatment provides significant improvement. There are no compliance problems.  Hypertensive end-organ damage includes kidney disease. Identifiable causes of hypertension include chronic renal disease.       Review of Systems   Constitutional:  Negative for chills, fever and malaise/fatigue.   Respiratory:  Negative for cough and shortness of breath.    Cardiovascular:  Negative for chest pain and leg swelling.   Gastrointestinal:  Negative for nausea and vomiting.   Genitourinary:  Negative for dysuria, frequency and urgency.   Musculoskeletal:  Positive for joint pain.              Objective     /64 (BP Location: Right arm, Patient Position: Sitting, BP Cuff Size: Adult)   Pulse 76   Temp 37.7 °C (99.8 °F) (Temporal)   Ht 1.778 m (5' 10\")   Wt 114 kg (251 lb 14.4 oz)   SpO2 95%   BMI 36.14 kg/m²      Physical Exam  Vitals and nursing note reviewed.   Constitutional:       General: He is not in acute distress.     Appearance: He is not ill-appearing.   HENT:      Head: Normocephalic and atraumatic.      Right Ear: External ear normal.      Left Ear: External ear normal.      Nose: Nose normal.   Eyes:      General:         Right eye: No discharge.        "  Left eye: No discharge.      Conjunctiva/sclera: Conjunctivae normal.   Cardiovascular:      Rate and Rhythm: Normal rate and regular rhythm.      Heart sounds: No murmur heard.  Pulmonary:      Effort: Pulmonary effort is normal. No respiratory distress.      Breath sounds: Normal breath sounds. No wheezing.   Musculoskeletal:         General: No tenderness or deformity.      Right lower leg: Edema present.      Left lower leg: Edema present.   Skin:     General: Skin is warm.   Neurological:      General: No focal deficit present.      Mental Status: He is alert and oriented to person, place, and time.   Psychiatric:         Mood and Affect: Mood normal.         Behavior: Behavior normal.     Past Medical History[1]  Social History     Socioeconomic History    Marital status:      Spouse name: Not on file    Number of children: Not on file    Years of education: Not on file    Highest education level: Some college, no degree   Occupational History    Not on file   Tobacco Use    Smoking status: Former     Current packs/day: 0.00     Average packs/day: 1 pack/day for 24.0 years (24.0 ttl pk-yrs)     Types: Cigarettes     Start date: 1967     Quit date: 1991     Years since quittin.5     Passive exposure: Past    Smokeless tobacco: Never   Vaping Use    Vaping status: Never Used   Substance and Sexual Activity    Alcohol use: Not Currently    Drug use: Not Currently     Types: Inhaled     Comment: occasional marijuana    Sexual activity: Not Currently     Partners: Female     Birth control/protection: Male Sterilization     Comment: vasectomy   Other Topics Concern    Not on file   Social History Narrative    Not on file     Social Drivers of Health     Financial Resource Strain: Low Risk  (2025)    Overall Financial Resource Strain (CARDIA)     Difficulty of Paying Living Expenses: Not very hard   Food Insecurity: No Food Insecurity (2025)    Hunger Vital Sign     Worried About  Running Out of Food in the Last Year: Never true     Ran Out of Food in the Last Year: Never true   Transportation Needs: No Transportation Needs (7/19/2025)    PRAPARE - Transportation     Lack of Transportation (Medical): No     Lack of Transportation (Non-Medical): No   Physical Activity: Inactive (7/19/2025)    Exercise Vital Sign     Days of Exercise per Week: 0 days     Minutes of Exercise per Session: 0 min   Stress: No Stress Concern Present (7/19/2025)    [employer] [place] [employer] - Occupational Stress Questionnaire     Feeling of Stress : Not at all   Social Connections: Socially Isolated (7/19/2025)    Social Connection and Isolation Panel [NHANES]     Frequency of Communication with Friends and Family: Once a week     Frequency of Social Gatherings with Friends and Family: Once a week     Attends Confucianism Services: Never     Active Member of Clubs or Organizations: No     Attends Club or Organization Meetings: Never     Marital Status:    Intimate Partner Violence: Not on file   Housing Stability: Low Risk  (7/19/2025)    Housing Stability Vital Sign     Unable to Pay for Housing in the Last Year: No     Number of Times Moved in the Last Year: 1     Homeless in the Last Year: No     Family History   Problem Relation Age of Onset    Colon Cancer Mother     Colorectal Cancer Mother     Glaucoma Father     No Known Problems Sister     No Known Problems Sister     No Known Problems Brother     No Known Problems Daughter     No Known Problems Son     No Known Problems Son     Colorectal Cancer Maternal Grandfather     Arterial Aneurysm Maternal Grandmother         fatal aortic aneurysm     Recent Labs     10/09/24  1215 10/14/24  1236 12/07/24  1135 03/17/25  1211 05/12/25  1400   ALBUMIN 4.0 4.0  --   --   --    HDL 28*  --   --   --   --    TRIGLYCERIDE 165*  --   --   --   --    SODIUM 138 140 138 138 137   POTASSIUM 4.4 4.9 5.1 5.1 4.9   CHLORIDE 103 105 110 108 109   CO2 21 21 20  "20 20   BUN 29* 34* 32* 34* 38*   CREATININE 1.81* 1.86* 1.67* 1.76* 1.81*                                  Assessment & Plan  Stage 3b chronic kidney disease  Stable  No uremic symptoms  Renal dose of medication  Avoid nephrotoxins  Continue same medication regimen  Patient was advised to call us if symptoms worsen    Orders:    Basic Metabolic Panel; Future    CBC WITHOUT DIFFERENTIAL; Future    MICROALB/CREAT RATIO RAND. UR    Essential hypertension, benign  Controlled  Continue same medication regimen  Continue low-sodium diet    Orders:    Basic Metabolic Panel; Future    CBC WITHOUT DIFFERENTIAL; Future    MICROALB/CREAT RATIO RAND. UR    Obstructive uropathy  Follow-up with urology  Orders:    Basic Metabolic Panel; Future    CBC WITHOUT DIFFERENTIAL; Future    MICROALB/CREAT RATIO RAND. UR    Atrophic kidney         Nephrolithiasis  Patient was advised to be on low-sodium diet  Increase water intake       Edema, unspecified type  Patient was advised to be on low-sodium diet  Continue diuretics  Check labs                         [1]   Past Medical History:  Diagnosis Date    Allergy     Anemia     currently under care of hematologist    Arthritis 2020    Blood transfusion without reported diagnosis     Bowel habit changes 2020    small intestine surgery- diarrhea/ constipation    Cancer (HCC) 2017    prostate    Dental disorder     \"teeth are in bad shape\"    Disorder of thyroid 1993    hypothyroid    History of kidney stones 09/20/2024    Passed another 3-, 6-      Hypertension 2010 ???    Pain 1992 - ongoing    injury- wrists, shoulder, knees, ankles- Arthritis \"all over\"    Renal disorder 2024    left kidney nonfunctioning    Urinary incontinence 2022     "

## 2025-07-31 ENCOUNTER — HOSPITAL ENCOUNTER (OUTPATIENT)
Dept: LAB | Facility: MEDICAL CENTER | Age: 70
End: 2025-07-31
Attending: PHYSICIAN ASSISTANT
Payer: MEDICARE

## 2025-07-31 DIAGNOSIS — R60.0 BILATERAL LOWER EXTREMITY EDEMA: ICD-10-CM

## 2025-07-31 LAB
ANION GAP SERPL CALC-SCNC: 11 MMOL/L (ref 7–16)
BUN SERPL-MCNC: 54 MG/DL (ref 8–22)
CALCIUM SERPL-MCNC: 9.9 MG/DL (ref 8.5–10.5)
CHLORIDE SERPL-SCNC: 113 MMOL/L (ref 96–112)
CO2 SERPL-SCNC: 18 MMOL/L (ref 20–33)
CREAT SERPL-MCNC: 2.01 MG/DL (ref 0.5–1.4)
GFR SERPLBLD CREATININE-BSD FMLA CKD-EPI: 35 ML/MIN/1.73 M 2
GLUCOSE SERPL-MCNC: 92 MG/DL (ref 65–99)
POTASSIUM SERPL-SCNC: 5.2 MMOL/L (ref 3.6–5.5)
SODIUM SERPL-SCNC: 142 MMOL/L (ref 135–145)

## 2025-07-31 PROCEDURE — 36415 COLL VENOUS BLD VENIPUNCTURE: CPT

## 2025-07-31 PROCEDURE — 80048 BASIC METABOLIC PNL TOTAL CA: CPT

## 2025-08-15 DIAGNOSIS — R60.0 BILATERAL LOWER EXTREMITY EDEMA: ICD-10-CM

## 2025-08-15 RX ORDER — FUROSEMIDE 20 MG/1
20 TABLET ORAL 2 TIMES DAILY
Qty: 180 TABLET | Refills: 1 | Status: SHIPPED | OUTPATIENT
Start: 2025-08-15

## 2025-08-18 ENCOUNTER — HOSPITAL ENCOUNTER (OUTPATIENT)
Dept: RADIOLOGY | Facility: MEDICAL CENTER | Age: 70
End: 2025-08-18
Attending: PHYSICIAN ASSISTANT
Payer: MEDICARE

## 2025-08-18 DIAGNOSIS — R60.0 BILATERAL LOWER EXTREMITY EDEMA: ICD-10-CM

## 2025-08-18 DIAGNOSIS — I87.2 VENOUS STASIS DERMATITIS: ICD-10-CM

## 2025-08-18 PROCEDURE — 93970 EXTREMITY STUDY: CPT

## 2025-08-20 ENCOUNTER — HOSPITAL ENCOUNTER (OUTPATIENT)
Dept: LAB | Facility: MEDICAL CENTER | Age: 70
End: 2025-08-20
Attending: PHYSICIAN ASSISTANT
Payer: MEDICARE

## 2025-08-20 DIAGNOSIS — D64.9 ANEMIA, UNSPECIFIED TYPE: ICD-10-CM

## 2025-08-20 DIAGNOSIS — R60.0 BILATERAL LOWER EXTREMITY EDEMA: ICD-10-CM

## 2025-08-20 LAB
ANION GAP SERPL CALC-SCNC: 11 MMOL/L (ref 7–16)
BASOPHILS # BLD AUTO: 0.6 % (ref 0–1.8)
BASOPHILS # BLD: 0.04 K/UL (ref 0–0.12)
BUN SERPL-MCNC: 47 MG/DL (ref 8–22)
CALCIUM SERPL-MCNC: 10.4 MG/DL (ref 8.5–10.5)
CHLORIDE SERPL-SCNC: 108 MMOL/L (ref 96–112)
CO2 SERPL-SCNC: 19 MMOL/L (ref 20–33)
CREAT SERPL-MCNC: 2.23 MG/DL (ref 0.5–1.4)
EOSINOPHIL # BLD AUTO: 0.46 K/UL (ref 0–0.51)
EOSINOPHIL NFR BLD: 7 % (ref 0–6.9)
ERYTHROCYTE [DISTWIDTH] IN BLOOD BY AUTOMATED COUNT: 49.1 FL (ref 35.9–50)
FERRITIN SERPL-MCNC: 968 NG/ML (ref 22–322)
GFR SERPLBLD CREATININE-BSD FMLA CKD-EPI: 31 ML/MIN/1.73 M 2
GLUCOSE SERPL-MCNC: 96 MG/DL (ref 65–99)
HCT VFR BLD AUTO: 37.1 % (ref 42–52)
HGB BLD-MCNC: 12.6 G/DL (ref 14–18)
IMM GRANULOCYTES # BLD AUTO: 0.05 K/UL (ref 0–0.11)
IMM GRANULOCYTES NFR BLD AUTO: 0.8 % (ref 0–0.9)
IRON SATN MFR SERPL: 35 % (ref 15–55)
IRON SERPL-MCNC: 64 UG/DL (ref 50–180)
LYMPHOCYTES # BLD AUTO: 1.08 K/UL (ref 1–4.8)
LYMPHOCYTES NFR BLD: 16.5 % (ref 22–41)
MCH RBC QN AUTO: 32.5 PG (ref 27–33)
MCHC RBC AUTO-ENTMCNC: 34 G/DL (ref 32.3–36.5)
MCV RBC AUTO: 95.6 FL (ref 81.4–97.8)
MONOCYTES # BLD AUTO: 0.65 K/UL (ref 0–0.85)
MONOCYTES NFR BLD AUTO: 9.9 % (ref 0–13.4)
NEUTROPHILS # BLD AUTO: 4.26 K/UL (ref 1.82–7.42)
NEUTROPHILS NFR BLD: 65.2 % (ref 44–72)
NRBC # BLD AUTO: 0 K/UL
NRBC BLD-RTO: 0 /100 WBC (ref 0–0.2)
PLATELET # BLD AUTO: 165 K/UL (ref 164–446)
PMV BLD AUTO: 13 FL (ref 9–12.9)
POTASSIUM SERPL-SCNC: 5.2 MMOL/L (ref 3.6–5.5)
RBC # BLD AUTO: 3.88 M/UL (ref 4.7–6.1)
SODIUM SERPL-SCNC: 138 MMOL/L (ref 135–145)
TIBC SERPL-MCNC: 182 UG/DL (ref 250–450)
UIBC SERPL-MCNC: 118 UG/DL (ref 110–370)
WBC # BLD AUTO: 6.5 K/UL (ref 4.8–10.8)

## 2025-08-20 PROCEDURE — 80048 BASIC METABOLIC PNL TOTAL CA: CPT

## 2025-08-20 PROCEDURE — 82728 ASSAY OF FERRITIN: CPT

## 2025-08-20 PROCEDURE — 85025 COMPLETE CBC W/AUTO DIFF WBC: CPT

## 2025-08-20 PROCEDURE — 36415 COLL VENOUS BLD VENIPUNCTURE: CPT

## 2025-08-20 PROCEDURE — 83540 ASSAY OF IRON: CPT

## 2025-08-20 PROCEDURE — 83550 IRON BINDING TEST: CPT

## 2025-08-28 ENCOUNTER — OFFICE VISIT (OUTPATIENT)
Dept: MEDICAL GROUP | Age: 70
End: 2025-08-28
Payer: MEDICARE

## 2025-08-28 VITALS
HEIGHT: 70 IN | WEIGHT: 255 LBS | BODY MASS INDEX: 36.51 KG/M2 | TEMPERATURE: 99 F | HEART RATE: 77 BPM | OXYGEN SATURATION: 98 % | SYSTOLIC BLOOD PRESSURE: 126 MMHG | DIASTOLIC BLOOD PRESSURE: 60 MMHG

## 2025-08-28 DIAGNOSIS — R60.0 BILATERAL LOWER EXTREMITY EDEMA: ICD-10-CM

## 2025-08-28 DIAGNOSIS — N18.32 STAGE 3B CHRONIC KIDNEY DISEASE: Primary | ICD-10-CM

## 2025-08-28 DIAGNOSIS — D64.9 ANEMIA, UNSPECIFIED TYPE: ICD-10-CM

## 2025-08-28 DIAGNOSIS — K59.1 FUNCTIONAL DIARRHEA: ICD-10-CM

## 2025-08-28 PROCEDURE — 99214 OFFICE O/P EST MOD 30 MIN: CPT | Performed by: PHYSICIAN ASSISTANT

## 2025-08-28 PROCEDURE — 3074F SYST BP LT 130 MM HG: CPT | Performed by: PHYSICIAN ASSISTANT

## 2025-08-28 PROCEDURE — 3078F DIAST BP <80 MM HG: CPT | Performed by: PHYSICIAN ASSISTANT

## 2025-08-28 ASSESSMENT — FIBROSIS 4 INDEX: FIB4 SCORE: 2.11

## (undated) DEVICE — ELECTRODE DUAL RETURN W/ CORD - (50/PK)

## (undated) DEVICE — GOWN SURGEONS X-LARGE - DISP. (30/CA)

## (undated) DEVICE — WIRE GUIDE SENSOR DUAL FLEX - 5/BX

## (undated) DEVICE — SLEEVE VASO DVT COMPRESSION CALF MED - (10PR/CA)

## (undated) DEVICE — SUCTION INSTRUMENT YANKAUER BULBOUS TIP W/O VENT (50EA/CA)

## (undated) DEVICE — TUBING CLEARLINK DUO-VENT - C-FLO (48EA/CA)

## (undated) DEVICE — COVER FOOT UNIVERSAL DISP. - (25EA/CA)

## (undated) DEVICE — BAG SPONGE COUNT 10.25 X 32 - BLUE (250/CA)

## (undated) DEVICE — SET EXTENSION WITH 2 PORTS (48EA/CA) ***PART #2C8610 IS A SUBSTITUTE*****

## (undated) DEVICE — WATER IRRIG. STER 3000 ML - (4/CA)

## (undated) DEVICE — GLOVE BIOGEL SZ 7.5 SURGICAL PF LTX - (50PR/BX 4BX/CA)

## (undated) DEVICE — SET LEADWIRE 5 LEAD BEDSIDE DISPOSABLE ECG (1SET OF 5/EA)

## (undated) DEVICE — CATHETER URETHRAL FOLEY SILICONE OD20 FR 10 ML (10EA/CA)

## (undated) DEVICE — SET IRRIGATION CYSTOSCOPY TUBE L80 IN (20EA/CA)

## (undated) DEVICE — SENSOR OXIMETER ADULT SPO2 RD SET (20EA/BX)

## (undated) DEVICE — COVER LIGHT HANDLE FLEXIBLE - SOFT (2EA/PK 80PK/CA)

## (undated) DEVICE — PACK CYSTOSCOPY III - (8/CA)

## (undated) DEVICE — GOWN SURGICAL X-LARGE ULTRA - FILM-REINFORCED (20/CA)

## (undated) DEVICE — HUMID-VENT HEAT AND MOISTURE EXCHANGE- (50/BX)

## (undated) DEVICE — WATER IRRIGATION STERILE 1000ML (12EA/CA)

## (undated) DEVICE — TOWEL STOP TIMEOUT SAFETY FLAG (40EA/CA)

## (undated) DEVICE — GLOVE BIOGEL M SZ 8 SURGICAL PF LTX - (50/BX 4BX/CA)

## (undated) DEVICE — SPONGE GAUZESTER 4 X 4 4PLY - (128PK/CA)

## (undated) DEVICE — LACTATED RINGERS INJ 1000 ML - (14EA/CA 60CA/PF)

## (undated) DEVICE — Device

## (undated) DEVICE — SODIUM CHL IRRIGATION 0.9% 1000ML (12EA/CA)

## (undated) DEVICE — COVER LIGHT HANDLE ALC PLUS DISP (18EA/BX)

## (undated) DEVICE — CANISTER SUCTION RIGID RED 1500CC (40EA/CA)

## (undated) DEVICE — CANISTER SUCTION 3000ML MECHANICAL FILTER AUTO SHUTOFF MEDI-VAC NONSTERILE LF DISP (40EA/CA)

## (undated) DEVICE — SET IRRIGATION CYSTOSCOPY Y-TYPE L81 IN (20EA/CA)

## (undated) DEVICE — GOWN WARMING STANDARD FLEX - (30/CA)

## (undated) DEVICE — BAG URODRAIN WITH TUBING - (20/CA)

## (undated) DEVICE — GLOVE BIOGEL SZ 8 SURGICAL PF LTX - (50PR/BX 4BX/CA)

## (undated) DEVICE — TUBE CONNECTING SUCTION - CLEAR PLASTIC STERILE 72 IN (50EA/CA)

## (undated) DEVICE — JELLY SURGILUBE STERILE FOIL 5 GM (150EA/BX)